# Patient Record
Sex: FEMALE | Race: WHITE | NOT HISPANIC OR LATINO | Employment: FULL TIME | URBAN - METROPOLITAN AREA
[De-identification: names, ages, dates, MRNs, and addresses within clinical notes are randomized per-mention and may not be internally consistent; named-entity substitution may affect disease eponyms.]

---

## 2019-10-15 ENCOUNTER — HISTORICAL (OUTPATIENT)
Dept: ADMINISTRATIVE | Facility: HOSPITAL | Age: 49
End: 2019-10-15

## 2020-01-23 ENCOUNTER — HISTORICAL (OUTPATIENT)
Dept: ADMINISTRATIVE | Facility: HOSPITAL | Age: 50
End: 2020-01-23

## 2022-04-30 NOTE — OP NOTE
DATE OF SURGERY:        SURGEON:  Bang Cristina MD    DIAGNOSIS:  Palmar fasciitis, right palm.    OPERATION:    1. 14040, development of multiple flaps, right palm.   2. 26121, partial right palmar fasciectomy.   3. 19488, short arm splint immobilization.    FIRST ASSISTANT:  Bob Rolon, surgical nurse.    HISTORY:  This patient had surgery on October 15, 2019, by me here.  She had extensive palmar fibromatosis.  I made a large zigzag flap with multiple Z-plasties on both the radial and the ulnar aspects of her right palm and carried out a fairly extensive palmar fasciectomy.  This worked out well, but she developed another band in between these areas where I did not operate, and she had some firmness over on the radial aspect.  I did 2 small fasciectomies through the zigzag incisions.  This looked like a combination of some unoperated palmar fasciitis as well as scar tissue.  This was sent to the laboratory for histological examination.  A small amount of Kenalog was instilled in the wound, and the 2 wounds were closed with 4-0 Vicryl sutures.  A large compression dressing with a Fabián-Chip universal hand splint was applied.     Because of the complexity of the procedure, it was necessary to use a surgical nurse first assistant during the surgery.        ______________________________  Bang Cristina MD    [facility]  DD:  01/23/2020  Time:  01:09PM  DT:  01/23/2020  Time:  01:25PM  Job #:  [id number]

## 2022-04-30 NOTE — OP NOTE
DATE OF SURGERY:        SURGEON:  Bang Cristina MD    OPERATION:    1. 14040, Development of multiple flaps, right palm.    2. 26121, Palmar fasciectomy, ulnar aspect of the right palm.    3. 26123, Palmar fasciectomy, radial aspect, right palm.    4. 29125, Splint immobilization.    5. 83359, Peripheral nerve block by surgeon with Exparel to lessen postoperative pain and the need for narcotics.    FIRST ASSISTANT:  Bob Quiñonez, surgical nurse.    INDICATIONS:  This lady has developed fairly rapidly progress in palmar fasciitis of the right palm.  She does not have any flexion contracture yet, but since this is a much more aggressive disease in a woman than a man, she is anxious to have this operated on and the fascia removed.  The procedure was discussed with her, as well as the risks and possibility of complication.  She was told that her hand would look terrible at first, but it would gradually get better and, at the end of a year, it should be very difficult to see where we had operated.  I talked to her about the risks and the possibility for further complication, including nerve damage and she understood this and accepted it.    DESCRIPTION OF PROCEDURE:  A pneumatic tourniquet was inflated to 280 mmHg after the arm had been exsanguinated.  A marking pen was used to beau out multiple Z-plasties on the ulnar aspect of her hand and a single Z-plasty on the radial aspect of the palm.  The Z-plasties were opened up exposing the entirety of the ulnar aspect of the palm.  Palmar fasciectomy was carried out of the ulnar aspect of the hand beginning at the ulnar base of the palm and all the way down to the fingers, taking care to avoid the neurovascular bundles and the underlying tendons.  This worked out quite well and was able to remove all the fibers that we saw.  A large singular Z-plasty was made on the radial aspect of the palm and the smaller mass of fascia was excised.  This specimen was  photographed and sent to the laboratory for histological examination.     0.5 cc of Kenalog was instilled in the wounds and all the flaps were interdigitated and sutured together with 5-0 Vicryl sutures in mattress fashion to elevate the skin.  The proximal aspects of the incisions were left open a little bit to allow for bandages.       At the end of the procedure, I partially carried out a peripheral nerve block utilizing Exparel to lessen her postoperative pain and, also, to markedly lessen the need of narcotics and opioids to help control that pain.  The Exparel should last approximately 3 days.  A large compression dressing with a Fabián-Chip universal     hand splint was applied.  Patient tolerated the procedure well and went to the recovery room in good condition.        ______________________________  MD HAYDEN Kyle/DAVEY  DD:  10/15/2019  Time:  12:20PM  DT:  10/15/2019  Time:  12:48PM  Job #:  [id number]

## 2024-02-07 ENCOUNTER — HOSPITAL ENCOUNTER (INPATIENT)
Facility: HOSPITAL | Age: 54
LOS: 4 days | Discharge: HOME | DRG: 871 | End: 2024-02-12
Attending: EMERGENCY MEDICINE | Admitting: INTERNAL MEDICINE

## 2024-02-07 DIAGNOSIS — E83.51 HYPOCALCEMIA: ICD-10-CM

## 2024-02-07 DIAGNOSIS — J18.9 COMMUNITY ACQUIRED PNEUMONIA, UNSPECIFIED LATERALITY: Primary | ICD-10-CM

## 2024-02-07 DIAGNOSIS — A41.9 SEPSIS: ICD-10-CM

## 2024-02-07 DIAGNOSIS — H66.90 OTITIS MEDIA, UNSPECIFIED LATERALITY, UNSPECIFIED OTITIS MEDIA TYPE: ICD-10-CM

## 2024-02-07 DIAGNOSIS — M62.82 NON-TRAUMATIC RHABDOMYOLYSIS: ICD-10-CM

## 2024-02-07 DIAGNOSIS — I95.9 HYPOTENSION, UNSPECIFIED HYPOTENSION TYPE: ICD-10-CM

## 2024-02-07 DIAGNOSIS — J10.1 INFLUENZA B: ICD-10-CM

## 2024-02-07 DIAGNOSIS — R51.9 NONINTRACTABLE HEADACHE, UNSPECIFIED CHRONICITY PATTERN, UNSPECIFIED HEADACHE TYPE: ICD-10-CM

## 2024-02-07 DIAGNOSIS — R00.1 BRADYCARDIA: ICD-10-CM

## 2024-02-07 DIAGNOSIS — E83.42 HYPOMAGNESEMIA: ICD-10-CM

## 2024-02-07 DIAGNOSIS — R07.9 CHEST PAIN: ICD-10-CM

## 2024-02-07 DIAGNOSIS — H66.91 RIGHT OTITIS MEDIA, UNSPECIFIED OTITIS MEDIA TYPE: ICD-10-CM

## 2024-02-07 PROBLEM — E86.1 HYPOVOLEMIA: Status: ACTIVE | Noted: 2024-02-07

## 2024-02-07 LAB
ALBUMIN SERPL-MCNC: 2.5 G/DL (ref 3.5–5)
ALBUMIN/GLOB SERPL: 1 RATIO (ref 1.1–2)
ALLENS TEST BLOOD GAS (OHS): ABNORMAL
ALP SERPL-CCNC: 84 UNIT/L (ref 40–150)
ALT SERPL-CCNC: 309 UNIT/L (ref 0–55)
APPEARANCE UR: ABNORMAL
AST SERPL-CCNC: 224 UNIT/L (ref 5–34)
BACTERIA #/AREA URNS AUTO: ABNORMAL /HPF
BASE EXCESS BLD CALC-SCNC: -2.3 MMOL/L
BASOPHILS # BLD AUTO: 0.02 X10(3)/MCL
BASOPHILS NFR BLD AUTO: 0.1 %
BILIRUB SERPL-MCNC: 1.4 MG/DL
BILIRUB UR QL STRIP.AUTO: NEGATIVE
BLOOD GAS SAMPLE TYPE (OHS): ABNORMAL
BNP BLD-MCNC: 36.9 PG/ML
BUN SERPL-MCNC: 24 MG/DL (ref 9.8–20.1)
CALCIUM SERPL-MCNC: 6.7 MG/DL (ref 8.4–10.2)
CHLORIDE SERPL-SCNC: 106 MMOL/L (ref 98–107)
CK SERPL-CCNC: 1301 U/L (ref 29–168)
CK SERPL-CCNC: 996 U/L (ref 29–168)
CO2 BLDA-SCNC: 23.7 MMOL/L
CO2 SERPL-SCNC: 19 MMOL/L (ref 22–29)
COHGB MFR BLDA: 2 %
COLOR UR AUTO: YELLOW
CREAT SERPL-MCNC: 1.4 MG/DL (ref 0.55–1.02)
D DIMER PPP IA.FEU-MCNC: >=20 UG/ML FEU (ref 0–0.5)
DRAWN BY BLOOD GAS (OHS): ABNORMAL
EOSINOPHIL # BLD AUTO: 0 X10(3)/MCL (ref 0–0.9)
EOSINOPHIL NFR BLD AUTO: 0 %
ERYTHROCYTE [DISTWIDTH] IN BLOOD BY AUTOMATED COUNT: 13.2 % (ref 11.5–17)
FLUAV AG UPPER RESP QL IA.RAPID: NOT DETECTED
FLUBV AG UPPER RESP QL IA.RAPID: DETECTED
GFR SERPLBLD CREATININE-BSD FMLA CKD-EPI: 46 MLS/MIN/1.73/M2
GLOBULIN SER-MCNC: 2.5 GM/DL (ref 2.4–3.5)
GLUCOSE SERPL-MCNC: 103 MG/DL (ref 74–100)
GLUCOSE UR QL STRIP.AUTO: NORMAL
GRAN CASTS #/AREA URNS LPF: ABNORMAL /LPF
HCO3 BLDA-SCNC: 22.5 MMOL/L
HCT VFR BLD AUTO: 33.3 % (ref 37–47)
HGB BLD-MCNC: 11.1 G/DL (ref 12–16)
HOLD SPECIMEN: NORMAL
HYALINE CASTS #/AREA URNS LPF: ABNORMAL /LPF
IMM GRANULOCYTES # BLD AUTO: 0.15 X10(3)/MCL (ref 0–0.04)
IMM GRANULOCYTES NFR BLD AUTO: 1 %
INHALED O2 CONCENTRATION: 21 %
KETONES UR QL STRIP.AUTO: ABNORMAL
LACTATE SERPL-SCNC: 1.4 MMOL/L (ref 0.5–2.2)
LACTATE SERPL-SCNC: 2 MMOL/L (ref 0.5–2.2)
LEUKOCYTE ESTERASE UR QL STRIP.AUTO: 25
LIPASE SERPL-CCNC: 6 U/L
LYMPHOCYTES # BLD AUTO: 0.44 X10(3)/MCL (ref 0.6–4.6)
LYMPHOCYTES NFR BLD AUTO: 2.8 %
MAGNESIUM SERPL-MCNC: 1.4 MG/DL (ref 1.6–2.6)
MCH RBC QN AUTO: 31 PG (ref 27–31)
MCHC RBC AUTO-ENTMCNC: 33.3 G/DL (ref 33–36)
MCV RBC AUTO: 93 FL (ref 80–94)
METHGB MFR BLDA: 0.7 %
MONOCYTES # BLD AUTO: 0.45 X10(3)/MCL (ref 0.1–1.3)
MONOCYTES NFR BLD AUTO: 2.9 %
MUCOUS THREADS URNS QL MICRO: ABNORMAL /LPF
NEUTROPHILS # BLD AUTO: 14.65 X10(3)/MCL (ref 2.1–9.2)
NEUTROPHILS NFR BLD AUTO: 93.2 %
NITRITE UR QL STRIP.AUTO: NEGATIVE
NRBC BLD AUTO-RTO: 0 %
O2 HB BLOOD GAS (OHS): 75.5 %
OXYHGB MFR BLDA: 12 G/DL
PCO2 BLDA: 38 MMHG (ref 40–50)
PH BLDA: 7.38 [PH] (ref 7.3–7.4)
PH UR STRIP.AUTO: 6 [PH]
PLATELET # BLD AUTO: 144 X10(3)/MCL (ref 130–400)
PMV BLD AUTO: 10.7 FL (ref 7.4–10.4)
PO2 BLDA: 36 MMHG (ref 30–40)
POTASSIUM SERPL-SCNC: 3.8 MMOL/L (ref 3.5–5.1)
PROT SERPL-MCNC: 5 GM/DL (ref 6.4–8.3)
PROT UR QL STRIP.AUTO: ABNORMAL
RBC # BLD AUTO: 3.58 X10(6)/MCL (ref 4.2–5.4)
RBC #/AREA URNS AUTO: ABNORMAL /HPF
RBC UR QL AUTO: ABNORMAL
RSV A 5' UTR RNA NPH QL NAA+PROBE: NOT DETECTED
SAO2 % BLDA: 77.6 %
SARS-COV-2 RNA RESP QL NAA+PROBE: NOT DETECTED
SODIUM SERPL-SCNC: 135 MMOL/L (ref 136–145)
SP GR UR STRIP.AUTO: 1.02 (ref 1–1.03)
SQUAMOUS #/AREA URNS LPF: ABNORMAL /HPF
TROPONIN I SERPL-MCNC: <0.01 NG/ML (ref 0–0.04)
UROBILINOGEN UR STRIP-ACNC: NORMAL
WBC # SPEC AUTO: 15.71 X10(3)/MCL (ref 4.5–11.5)
WBC #/AREA URNS AUTO: ABNORMAL /HPF

## 2024-02-07 PROCEDURE — 25500020 PHARM REV CODE 255

## 2024-02-07 PROCEDURE — 96372 THER/PROPH/DIAG INJ SC/IM: CPT

## 2024-02-07 PROCEDURE — 0241U COVID/RSV/FLU A&B PCR: CPT | Performed by: EMERGENCY MEDICINE

## 2024-02-07 PROCEDURE — 96365 THER/PROPH/DIAG IV INF INIT: CPT

## 2024-02-07 PROCEDURE — G0378 HOSPITAL OBSERVATION PER HR: HCPCS

## 2024-02-07 PROCEDURE — 84484 ASSAY OF TROPONIN QUANT: CPT | Performed by: EMERGENCY MEDICINE

## 2024-02-07 PROCEDURE — 96368 THER/DIAG CONCURRENT INF: CPT

## 2024-02-07 PROCEDURE — 87086 URINE CULTURE/COLONY COUNT: CPT | Performed by: EMERGENCY MEDICINE

## 2024-02-07 PROCEDURE — 80053 COMPREHEN METABOLIC PANEL: CPT | Performed by: EMERGENCY MEDICINE

## 2024-02-07 PROCEDURE — 84466 ASSAY OF TRANSFERRIN: CPT

## 2024-02-07 PROCEDURE — 25000003 PHARM REV CODE 250: Performed by: EMERGENCY MEDICINE

## 2024-02-07 PROCEDURE — 83880 ASSAY OF NATRIURETIC PEPTIDE: CPT | Performed by: EMERGENCY MEDICINE

## 2024-02-07 PROCEDURE — 81001 URINALYSIS AUTO W/SCOPE: CPT | Performed by: EMERGENCY MEDICINE

## 2024-02-07 PROCEDURE — 96375 TX/PRO/DX INJ NEW DRUG ADDON: CPT

## 2024-02-07 PROCEDURE — 87154 CUL TYP ID BLD PTHGN 6+ TRGT: CPT | Performed by: EMERGENCY MEDICINE

## 2024-02-07 PROCEDURE — 83540 ASSAY OF IRON: CPT

## 2024-02-07 PROCEDURE — 83690 ASSAY OF LIPASE: CPT | Performed by: EMERGENCY MEDICINE

## 2024-02-07 PROCEDURE — 87040 BLOOD CULTURE FOR BACTERIA: CPT | Performed by: EMERGENCY MEDICINE

## 2024-02-07 PROCEDURE — 80074 ACUTE HEPATITIS PANEL: CPT

## 2024-02-07 PROCEDURE — 82803 BLOOD GASES ANY COMBINATION: CPT

## 2024-02-07 PROCEDURE — 85379 FIBRIN DEGRADATION QUANT: CPT | Performed by: EMERGENCY MEDICINE

## 2024-02-07 PROCEDURE — 99900035 HC TECH TIME PER 15 MIN (STAT)

## 2024-02-07 PROCEDURE — 87077 CULTURE AEROBIC IDENTIFY: CPT | Performed by: EMERGENCY MEDICINE

## 2024-02-07 PROCEDURE — 25000003 PHARM REV CODE 250

## 2024-02-07 PROCEDURE — 82728 ASSAY OF FERRITIN: CPT

## 2024-02-07 PROCEDURE — 83735 ASSAY OF MAGNESIUM: CPT | Performed by: EMERGENCY MEDICINE

## 2024-02-07 PROCEDURE — 83605 ASSAY OF LACTIC ACID: CPT | Performed by: EMERGENCY MEDICINE

## 2024-02-07 PROCEDURE — 85025 COMPLETE CBC W/AUTO DIFF WBC: CPT | Performed by: EMERGENCY MEDICINE

## 2024-02-07 PROCEDURE — 63600175 PHARM REV CODE 636 W HCPCS

## 2024-02-07 PROCEDURE — 63600175 PHARM REV CODE 636 W HCPCS: Performed by: EMERGENCY MEDICINE

## 2024-02-07 PROCEDURE — 82550 ASSAY OF CK (CPK): CPT | Mod: 91 | Performed by: EMERGENCY MEDICINE

## 2024-02-07 PROCEDURE — 93005 ELECTROCARDIOGRAM TRACING: CPT

## 2024-02-07 RX ORDER — ACETAMINOPHEN 500 MG
1000 TABLET ORAL
Status: COMPLETED | OUTPATIENT
Start: 2024-02-07 | End: 2024-02-07

## 2024-02-07 RX ORDER — CALCIUM GLUCONATE 20 MG/ML
1 INJECTION, SOLUTION INTRAVENOUS
Status: COMPLETED | OUTPATIENT
Start: 2024-02-07 | End: 2024-02-07

## 2024-02-07 RX ORDER — ENOXAPARIN SODIUM 100 MG/ML
40 INJECTION SUBCUTANEOUS EVERY 24 HOURS
Status: DISCONTINUED | OUTPATIENT
Start: 2024-02-07 | End: 2024-02-12 | Stop reason: HOSPADM

## 2024-02-07 RX ORDER — IBUPROFEN 200 MG
16 TABLET ORAL
Status: DISCONTINUED | OUTPATIENT
Start: 2024-02-07 | End: 2024-02-12 | Stop reason: HOSPADM

## 2024-02-07 RX ORDER — SODIUM CHLORIDE, SODIUM LACTATE, POTASSIUM CHLORIDE, CALCIUM CHLORIDE 600; 310; 30; 20 MG/100ML; MG/100ML; MG/100ML; MG/100ML
1000 INJECTION, SOLUTION INTRAVENOUS
Status: COMPLETED | OUTPATIENT
Start: 2024-02-07 | End: 2024-02-07

## 2024-02-07 RX ORDER — IBUPROFEN 200 MG
24 TABLET ORAL
Status: DISCONTINUED | OUTPATIENT
Start: 2024-02-07 | End: 2024-02-12 | Stop reason: HOSPADM

## 2024-02-07 RX ORDER — ONDANSETRON HYDROCHLORIDE 2 MG/ML
4 INJECTION, SOLUTION INTRAVENOUS
Status: COMPLETED | OUTPATIENT
Start: 2024-02-07 | End: 2024-02-07

## 2024-02-07 RX ORDER — OSELTAMIVIR PHOSPHATE 75 MG/1
75 CAPSULE ORAL 2 TIMES DAILY
Status: DISCONTINUED | OUTPATIENT
Start: 2024-02-07 | End: 2024-02-12 | Stop reason: HOSPADM

## 2024-02-07 RX ORDER — NALOXONE HCL 0.4 MG/ML
0.02 VIAL (ML) INJECTION
Status: DISCONTINUED | OUTPATIENT
Start: 2024-02-07 | End: 2024-02-12 | Stop reason: HOSPADM

## 2024-02-07 RX ORDER — MAGNESIUM SULFATE HEPTAHYDRATE 40 MG/ML
2 INJECTION, SOLUTION INTRAVENOUS
Status: COMPLETED | OUTPATIENT
Start: 2024-02-07 | End: 2024-02-07

## 2024-02-07 RX ORDER — SODIUM CHLORIDE 9 MG/ML
1000 INJECTION, SOLUTION INTRAVENOUS
Status: COMPLETED | OUTPATIENT
Start: 2024-02-07 | End: 2024-02-07

## 2024-02-07 RX ORDER — SODIUM CHLORIDE 0.9 % (FLUSH) 0.9 %
10 SYRINGE (ML) INJECTION EVERY 12 HOURS PRN
Status: DISCONTINUED | OUTPATIENT
Start: 2024-02-07 | End: 2024-02-12 | Stop reason: HOSPADM

## 2024-02-07 RX ORDER — GLUCAGON 1 MG
1 KIT INJECTION
Status: DISCONTINUED | OUTPATIENT
Start: 2024-02-07 | End: 2024-02-12 | Stop reason: HOSPADM

## 2024-02-07 RX ORDER — SODIUM CHLORIDE, SODIUM LACTATE, POTASSIUM CHLORIDE, CALCIUM CHLORIDE 600; 310; 30; 20 MG/100ML; MG/100ML; MG/100ML; MG/100ML
INJECTION, SOLUTION INTRAVENOUS CONTINUOUS
Status: DISCONTINUED | OUTPATIENT
Start: 2024-02-07 | End: 2024-02-12 | Stop reason: HOSPADM

## 2024-02-07 RX ADMIN — SODIUM CHLORIDE, POTASSIUM CHLORIDE, SODIUM LACTATE AND CALCIUM CHLORIDE: 600; 310; 30; 20 INJECTION, SOLUTION INTRAVENOUS at 10:02

## 2024-02-07 RX ADMIN — SODIUM CHLORIDE 1000 ML: 9 INJECTION, SOLUTION INTRAVENOUS at 07:02

## 2024-02-07 RX ADMIN — IOHEXOL 100 ML: 350 INJECTION, SOLUTION INTRAVENOUS at 05:02

## 2024-02-07 RX ADMIN — VANCOMYCIN HYDROCHLORIDE 1750 MG: 1.25 INJECTION, POWDER, LYOPHILIZED, FOR SOLUTION INTRAVENOUS at 03:02

## 2024-02-07 RX ADMIN — ACETAMINOPHEN 1000 MG: 500 TABLET ORAL at 02:02

## 2024-02-07 RX ADMIN — SODIUM CHLORIDE, POTASSIUM CHLORIDE, SODIUM LACTATE AND CALCIUM CHLORIDE 1000 ML: 600; 310; 30; 20 INJECTION, SOLUTION INTRAVENOUS at 06:02

## 2024-02-07 RX ADMIN — ONDANSETRON 4 MG: 2 INJECTION INTRAMUSCULAR; INTRAVENOUS at 02:02

## 2024-02-07 RX ADMIN — PIPERACILLIN AND TAZOBACTAM 4.5 G: 4; .5 INJECTION, POWDER, LYOPHILIZED, FOR SOLUTION INTRAVENOUS; PARENTERAL at 02:02

## 2024-02-07 RX ADMIN — PIPERACILLIN AND TAZOBACTAM 4.5 G: 4; .5 INJECTION, POWDER, LYOPHILIZED, FOR SOLUTION INTRAVENOUS; PARENTERAL at 10:02

## 2024-02-07 RX ADMIN — CALCIUM GLUCONATE 1 G: 20 INJECTION, SOLUTION INTRAVENOUS at 06:02

## 2024-02-07 RX ADMIN — CALCIUM GLUCONATE 1 G: 20 INJECTION, SOLUTION INTRAVENOUS at 04:02

## 2024-02-07 RX ADMIN — MAGNESIUM SULFATE HEPTAHYDRATE 2 G: 40 INJECTION, SOLUTION INTRAVENOUS at 05:02

## 2024-02-07 RX ADMIN — DOXYCYCLINE 100 MG: 100 INJECTION, POWDER, LYOPHILIZED, FOR SOLUTION INTRAVENOUS at 02:02

## 2024-02-07 RX ADMIN — ENOXAPARIN SODIUM 40 MG: 40 INJECTION SUBCUTANEOUS at 10:02

## 2024-02-07 RX ADMIN — SODIUM CHLORIDE 2718 ML: 9 INJECTION, SOLUTION INTRAVENOUS at 02:02

## 2024-02-07 RX ADMIN — OSELTAMAVIR PHOSPHATE 75 MG: 75 CAPSULE ORAL at 10:02

## 2024-02-07 NOTE — ED PROVIDER NOTES
ED PROVIDER NOTE  2/7/2024    CHIEF COMPLAINT:   Chief Complaint   Patient presents with    Altered Mental Status     Disoriented x 2 days; febrile, bilateral flank pain x 2 days. No OTC meds today. Sent from an urgent care.       HISTORY OF PRESENT ILLNESS:   Loreta Keith is a 53 y.o. female who presents with chief complaint Flu-like symptoms. Onset was about a week ago with cough associated with bodyaches, fever, nausea, vomiting, sinus congestion, right earache, generalized weakness, and lightheadedness. Reports went to urgent care 2 days ago and was started on levaquin, prednisone, antibiotic ear drops, without any improvement. Reports she feels very thirsty and confused. Denies headache, chest pain, abdominal pain, dysuria.    The history is provided by the patient.         REVIEW OF SYSTEMS: as noted in the HPI.  NURSING NOTES REVIEWED      PAST MEDICAL/SURGICAL HISTORY: History reviewed. No pertinent past medical history. History reviewed. No pertinent surgical history.    FAMILY HISTORY: History reviewed. No pertinent family history.    SOCIAL HISTORY:   Social History     Tobacco Use    Smoking status: Never    Smokeless tobacco: Never       ALLERGIES: Review of patient's allergies indicates:  No Known Allergies    PHYSICAL EXAM:  Initial Vitals [02/07/24 1404]   BP Pulse Resp Temp SpO2   (!) 86/51 (!) 130 (!) 22 (!) 103.1 °F (39.5 °C) 95 %      MAP       --         Physical Exam    Nursing note and vitals reviewed.  Constitutional: She appears well-developed and well-nourished. She appears lethargic. She has a sickly appearance. She appears ill. She appears distressed.   HENT:   Head: Normocephalic and atraumatic.   Right Ear: Tympanic membrane is erythematous and bulging. Tympanic membrane is not injected. A middle ear effusion is present.   Left Ear: Tympanic membrane normal.   Mouth/Throat: Uvula is midline. Mucous membranes are dry.   Eyes: EOM are normal. Pupils are equal, round, and reactive to  light.   Neck: Trachea normal. Neck supple.   Cardiovascular:  Regular rhythm and normal pulses.   Tachycardia present.         Pulmonary/Chest: Effort normal and breath sounds normal.   Abdominal: Abdomen is soft. Bowel sounds are normal. There is no abdominal tenderness.   No right CVA tenderness.  No left CVA tenderness. There is no rebound and no guarding.   Musculoskeletal:         General: Normal range of motion.      Cervical back: Neck supple.      Thoracic back: No bony tenderness.      Lumbar back: No bony tenderness.     Neurological: She is oriented to person, place, and time. She appears lethargic. GCS eye subscore is 4. GCS verbal subscore is 5. GCS motor subscore is 6.   Clouded mentation   Skin: Skin is warm and dry.   Psychiatric: She has a normal mood and affect. Thought content normal. Her speech is delayed. She is slowed.         RESULTS:  Labs Reviewed   COMPREHENSIVE METABOLIC PANEL - Abnormal; Notable for the following components:       Result Value    Sodium Level 135 (*)     Carbon Dioxide 19 (*)     Glucose Level 103 (*)     Blood Urea Nitrogen 24.0 (*)     Creatinine 1.40 (*)     Calcium Level Total 6.7 (*)     Protein Total 5.0 (*)     Albumin Level 2.5 (*)     Albumin/Globulin Ratio 1.0 (*)     Alanine Aminotransferase 309 (*)     Aspartate Aminotransferase 224 (*)     All other components within normal limits   URINALYSIS, REFLEX TO URINE CULTURE - Abnormal; Notable for the following components:    Appearance, UA Turbid (*)     Protein, UA 2+ (*)     Ketones, UA Trace (*)     Blood, UA 2+ (*)     Leukocyte Esterase, UA 25 (*)     WBC, UA 11-20 (*)     Bacteria, UA Trace (*)     Squamous Epithelial Cells, UA Occ (*)     Mucous, UA Trace (*)     Granular Casts 3-5 (*)     All other components within normal limits   CBC WITH DIFFERENTIAL - Abnormal; Notable for the following components:    WBC 15.71 (*)     RBC 3.58 (*)     Hgb 11.1 (*)     Hct 33.3 (*)     MPV 10.7 (*)     Lymph # 0.44  (*)     Neut # 14.65 (*)     IG# 0.15 (*)     All other components within normal limits   COVID/RSV/FLU A&B PCR - Abnormal; Notable for the following components:    Influenza B PCR Detected (*)     All other components within normal limits    Narrative:     The Xpert Xpress SARS-CoV-2/FLU/RSV plus is a rapid, multiplexed real-time PCR test intended for the simultaneous qualitative detection and differentiation of SARS-CoV-2, Influenza A, Influenza B, and respiratory syncytial virus (RSV) viral RNA in either nasopharyngeal swab or nasal swab specimens.         D DIMER, QUANTITATIVE - Abnormal; Notable for the following components:    D-Dimer >=20.00 (*)     All other components within normal limits   MAGNESIUM - Abnormal; Notable for the following components:    Magnesium Level 1.40 (*)     All other components within normal limits   CK - Abnormal; Notable for the following components:    Creatine Kinase 996 (*)     All other components within normal limits   CK - Abnormal; Notable for the following components:    Creatine Kinase 1,301 (*)     All other components within normal limits   BLOOD GAS - Abnormal; Notable for the following components:    pCO2, Blood gas 38.0 (*)     All other components within normal limits   LACTIC ACID, PLASMA - Normal   B-TYPE NATRIURETIC PEPTIDE - Normal   TROPONIN I - Normal   LIPASE - Normal   LACTIC ACID, PLASMA - Normal   BLOOD CULTURE [facility]   BLOOD CULTURE [facility]   CULTURE, URINE   CBC W/ AUTO DIFFERENTIAL    Narrative:     The following orders were created for panel order CBC auto differential.  Procedure                               Abnormality         Status                     ---------                               -----------         ------                     CBC with Differential[[id number]]       Abnormal            Final result                 Please view results for these tests on the individual orders.   EXTRA TUBES    Narrative:     The following orders were created for  panel order EXTRA TUBES.  Procedure                               Abnormality         Status                     ---------                               -----------         ------                     Light Blue Top Hold[[id number]]                             Final result               Red Top Hold[[id number]]                                    Final result               Light Green Top Hold[[id number]]                            Final result               Light Green Top Hold[[id number]]                            Final result               Gold Top Hold[[id number]]                                   Final result                 Please view results for these tests on the individual orders.   LIGHT BLUE TOP HOLD   RED TOP HOLD   LIGHT GREEN TOP HOLD   LIGHT GREEN TOP HOLD   GOLD TOP HOLD   EXTRA TUBES    Narrative:     The following orders were created for panel order EXTRA TUBES.  Procedure                               Abnormality         Status                     ---------                               -----------         ------                     Lavender Top Hold[[id number]]                               Final result                 Please view results for these tests on the individual orders.   LAVENDER TOP HOLD     Imaging Results              CTA Chest Non-Coronary (PE Studies) (Final result)  Result time 02/07/24 17:55:01      Final result by Freddie Pandya MD (02/07/24 17:55:01)                   Impression:      No pulmonary embolism identified.    Few small areas of ground-glass in the left lower lobe may be mild infection or inflammation.      Electronically signed by: Freddie Pandya  Date:    02/07/2024  Time:    17:55               Narrative:    EXAMINATION:  CTA CHEST NON CORONARY (PE STUDIES)    CLINICAL HISTORY:  Pulmonary embolism (PE) suspected, positive D-dimer;    TECHNIQUE:  CTA imaging of the chest after IV contrast. Axial, coronal and sagittal reconstructions, including MIP images, are  reviewed. Dose length product 194 mGycm. Automatic exposure control, adjustment of mA/kV or iterative reconstruction technique used to limit radiation dose.    COMPARISON:  No relevant comparison studies available at the time of dictation.    FINDINGS:  Diagnostic quality: Adequate    Pulmonary embolism: None identified.    Lung parenchyma: Mild motion, but few small areas of ground-glass identified within the left lower lobe.    Pleural effusion: None.    Mediastinum/loraine: Normal heart size and thoracic aortic caliber.  No pathologically enlarged lymph node.    Chest wall/axilla: No significant findings.    Upper abdomen: No significant findings.    Bones: No acute osseous process.                                       X-Ray Chest AP Portable (Final result)  Result time 02/07/24 14:36:23      Final result by Shay Gregory MD (02/07/24 14:36:23)                   Impression:      No acute cardiopulmonary process.      Electronically signed by: Shay Gregory  Date:    02/07/2024  Time:    14:36               Narrative:    EXAMINATION:  XR CHEST AP PORTABLE    CLINICAL HISTORY:  Sepsis;    TECHNIQUE:  Single view of the chest    COMPARISON:  No prior imaging available for comparison.    FINDINGS:  No focal opacification, pleural effusion, or pneumothorax.    The cardiomediastinal silhouette is within normal limits.    No acute osseous abnormality.                                      PROCEDURES:  Critical Care    Date/Time: 2/7/2024 8:48 PM    Performed by: Zaire Augustine DO  Authorized by: Zaire Augustine DO  Direct patient critical care time: 22 minutes  Ordering / reviewing critical care time: 5 minutes  Documentation critical care time: 5 minutes  Total critical care time (exclusive of procedural time) : 32 minutes  Critical care time was exclusive of separately billable procedures and treating other patients.  Critical care was necessary to treat or prevent imminent or life-threatening deterioration of the  following conditions: circulatory failure, sepsis, dehydration and renal failure.  Critical care was time spent personally by me on the following activities: development of treatment plan with patient or surrogate, interpretation of cardiac output measurements, evaluation of patient's response to treatment, examination of patient, obtaining history from patient or surrogate, ordering and performing treatments and interventions, ordering and review of laboratory studies, ordering and review of radiographic studies, pulse oximetry and re-evaluation of patient's condition.          ECG:  EKG Readings: (Independently Interpreted)   Initial Reading: No STEMI. Rhythm: Normal Sinus Rhythm. Heart Rate: 99. Ectopy: PACs. Conduction: Normal. Axis: Right Axis Deviation.       ED COURSE AND MEDICAL DECISION MAKING:  Medications   piperacillin-tazobactam (ZOSYN) 4.5 g in dextrose 5 % in water (D5W) 100 mL IVPB (MB+) (has no administration in time range)   sodium chloride 0.9% bolus 2,718 mL 2,718 mL (0 mLs Intravenous Stopped 2/7/24 1525)   piperacillin-tazobactam (ZOSYN) 4.5 g in dextrose 5 % in water (D5W) 100 mL IVPB (MB+) (0 g Intravenous Stopped 2/7/24 1508)   vancomycin (VANCOCIN) 1,750 mg in dextrose 5 % (D5W) 500 mL IVPB (0 mg Intravenous Stopped 2/7/24 1725)   acetaminophen tablet 1,000 mg (1,000 mg Oral Given 2/7/24 1427)   doxycycline 100 mg in dextrose 5 % in water (D5W) 100 mL IVPB (MB+) (0 mg Intravenous Stopped 2/7/24 1557)   ondansetron injection 4 mg (4 mg Intravenous Given 2/7/24 1430)   calcium gluconate 1 g in NS IVPB (premixed) (0 g Intravenous Stopped 2/7/24 1735)   lactated ringers infusion (0 mLs Intravenous Stopped 2/7/24 1856)   magnesium sulfate 2g in water 50mL IVPB (premix) (0 g Intravenous Stopped 2/7/24 1843)   iohexoL (OMNIPAQUE 350) 350 mg iodine/mL injection (100 mLs  Given 2/7/24 1715)   calcium gluconate 1 g in NS IVPB (premixed) (1 g Intravenous New Bag 2/7/24 1842)   0.9%  NaCl infusion  (1,000 mLs Intravenous New Bag 2/7/24 1925)     ED Course as of 02/07/24 2116 Wed Feb 07, 2024   1451 Leukocyte Esterase, UA(!): 25 [IB]   1451 WBC, UA(!): 11-20 [IB]   1451 Bacteria, UA(!): Trace [IB]   1451 Squamous Epithelial Cells, UA(!): Occ [IB]   1451 Mucous, UA(!): Trace [IB]   1451 Granular Casts(!): 3-5 [IB]   1451 RBC, UA: 0-5 [IB]   1451 Ketones, UA(!): Trace [IB]   1451 Protein, UA(!): 2+ [IB]   1501 WBC(!): 15.71 [IB]   1501 Hemoglobin(!): 11.1 [IB]   1501 Platelet Count: 144 [IB]   1501 Lymph #(!): 0.44 [IB]   1501 Neut #(!): 14.65 [IB]   1517 BNP: 36.9 [IB]   1517 Troponin I: <0.010 [IB]   1517 Lactic Acid Level: 2.0 [IB]   1518 Calcium(!!): 6.7  7.9 corrected for albumin [IB]   1518 Creatinine(!): 1.40 [IB]   1518 BUN(!): 24.0 [IB]   1518 CO2(!): 19 [IB]   1518 ALT(!): 309 [IB]   1518 AST(!): 224 [IB]   1545 Influenza A, Molecular: Not Detected [IB]   1546 Influenza B, Molecular(!): Detected [IB]   1546 RSV Ag by Molecular Method: Not Detected [IB]   1546 SARS-CoV2 (COVID-19) Qualitative PCR: Not Detected [IB]   1555 D-Dimer(!): >=20.00 [IB]   1619 CPK(!): 996 [IB]   1637 Magnesium (!): 1.40 [IB]   1637 Lipase: 6 [IB]   1851 CPK(!): 1,301 [IB]   1932 POC PH: 7.380 [IB]      ED Course User Index  [IB] Zaire Augustine, DO        Medical Decision Making  53-year-old female who presents with flu-like symptoms over the past week having some clotting of consciousness.  She was hypotensive and tachycardic and febrile upon arrival.  Sepsis orders initiated and she was given 30 milliliter/kg fluid bolus.  Blood pressure did seem to respond to fluid administration however would dip back down after fluids were discontinued so she was given an additional 3 more L of IV fluids.  Her chest x-ray read by radiologist showing no acute intrathoracic process.  CBC shows a leukocytosis of 15.7 with a hemoglobin 11.1.  CMP shows creatinine of 1.40, no previous labs for comparison, calcium 6.7 with albumin 2.5 in a  corrected calcium of 7.9.  Magnesium 1.4.  Given IV calcium gluconate along with magnesium sulfate.  Initial  with repeat CPK trending up at 1301.  VBG pH 7.38.  D-dimer significantly elevated so CTA chest was obtained which showed no evidence of PE and ground-glass opacities in the left lower lobe consistent with pneumonia.  Flu B positive.I attest a sepsis perfusion exam was performed within 6 hours of sepsis, severe sepsis, or septic shock presentation, following fluid resuscitation. I have spoken with the patient and/or caregivers. I have explained the patient's condition, diagnoses and treatment plan based on the information available to me at this time. I have answered the patient's and/or caregiver's questions and addressed any concerns. The patient and/or caregivers have as good an understanding of the patient's diagnosis, condition and treatment plan as can be expected at this point. The patient has been stabilized within the capability of the emergency department. The patient will be transported for further care and management or will be moved to an observation or inpatient service. I have communicated with the staff or medical practitioner taking over this patient's care.     Amount and/or Complexity of Data Reviewed  Labs: ordered. Decision-making details documented in ED Course.  Radiology: ordered.  ECG/medicine tests: ordered and independent interpretation performed.    Risk  OTC drugs.  Prescription drug management.  Decision regarding hospitalization.        CLINICAL IMPRESSION:  1. Community acquired pneumonia, unspecified laterality    2. Sepsis    3. Hypocalcemia    4. Hypotension, unspecified hypotension type    5. Influenza B    6. Non-traumatic rhabdomyolysis        DISPOSITION:   ED Disposition Condition    Admit Stable                    Zaire Augustine DO  02/07/24 2630

## 2024-02-07 NOTE — LETTER
February 10, 2024         [address] [place] [id number]  Phone: [phone]  Fax: [phone]       Date of hospitalization: 02/09/2024 and 02/10/2024    To Whom It May Concern:    Please be advised that under state and federal laws as it relates to patient privacy and Health Insurance Accountability Act (HIPAA), we can not release our patient(s) name without authorization. Although, we can confirm that the individual listed below did accompany a person to our facility for healthcare services to be provided during inpatient hospitalization.    This document confirms that Chandler Torres accompanied a patient to our facility on 02/09/2024 and 02/10/2024.    Sincerely,     Health Care Team at [facility]

## 2024-02-08 LAB
ABS NEUT CALC (OHS): 10.54 X10(3)/MCL (ref 2.1–9.2)
ALBUMIN SERPL-MCNC: 2.6 G/DL (ref 3.5–5)
ALBUMIN/GLOB SERPL: 0.9 RATIO (ref 1.1–2)
ALP SERPL-CCNC: 92 UNIT/L (ref 40–150)
ALT SERPL-CCNC: 260 UNIT/L (ref 0–55)
ANISOCYTOSIS BLD QL SMEAR: ABNORMAL
AST SERPL-CCNC: 139 UNIT/L (ref 5–34)
B PERT.PT PRMT NPH QL NAA+NON-PROBE: NOT DETECTED
BILIRUB SERPL-MCNC: 1 MG/DL
BUN SERPL-MCNC: 14.1 MG/DL (ref 9.8–20.1)
C PNEUM DNA NPH QL NAA+NON-PROBE: NOT DETECTED
CALCIUM SERPL-MCNC: 7.8 MG/DL (ref 8.4–10.2)
CHLORIDE SERPL-SCNC: 114 MMOL/L (ref 98–107)
CK SERPL-CCNC: 862 U/L (ref 29–168)
CO2 SERPL-SCNC: 19 MMOL/L (ref 22–29)
CREAT SERPL-MCNC: 1.02 MG/DL (ref 0.55–1.02)
EOSINOPHIL NFR BLD MANUAL: ABNORMAL %
EOSINOPHIL NFR BLD MANUAL: ABNORMAL %
ERYTHROCYTE [DISTWIDTH] IN BLOOD BY AUTOMATED COUNT: 13.5 % (ref 11.5–17)
FERRITIN SERPL-MCNC: 979.46 NG/ML (ref 4.63–204)
FOLATE SERPL-MCNC: 10.4 NG/ML (ref 7–31.4)
GFR SERPLBLD CREATININE-BSD FMLA CKD-EPI: >60 MLS/MIN/1.73/M2
GLOBULIN SER-MCNC: 3 GM/DL (ref 2.4–3.5)
GLUCOSE SERPL-MCNC: 136 MG/DL (ref 74–100)
HADV DNA NPH QL NAA+NON-PROBE: NOT DETECTED
HAV IGM SERPL QL IA: NONREACTIVE
HBV CORE IGM SERPL QL IA: NONREACTIVE
HBV SURFACE AG SERPL QL IA: NONREACTIVE
HCOV 229E RNA NPH QL NAA+NON-PROBE: NOT DETECTED
HCOV HKU1 RNA NPH QL NAA+NON-PROBE: NOT DETECTED
HCOV NL63 RNA NPH QL NAA+NON-PROBE: NOT DETECTED
HCOV OC43 RNA NPH QL NAA+NON-PROBE: NOT DETECTED
HCT VFR BLD AUTO: 35.3 % (ref 37–47)
HCV AB SERPL QL IA: NONREACTIVE
HGB BLD-MCNC: 11.9 G/DL (ref 12–16)
HMPV RNA NPH QL NAA+NON-PROBE: NOT DETECTED
HOLD SPECIMEN: NORMAL
HOLD SPECIMEN: NORMAL
HPIV1 RNA NPH QL NAA+NON-PROBE: NOT DETECTED
HPIV2 RNA NPH QL NAA+NON-PROBE: NOT DETECTED
HPIV3 RNA NPH QL NAA+NON-PROBE: NOT DETECTED
HPIV4 RNA NPH QL NAA+NON-PROBE: NOT DETECTED
IRON SATN MFR SERPL: 7 % (ref 20–50)
IRON SERPL-MCNC: 15 UG/DL (ref 50–170)
LYMPHOCYTES NFR BLD MANUAL: 0.22 X10(3)/MCL
LYMPHOCYTES NFR BLD MANUAL: 2 % (ref 13–40)
M PNEUMO DNA NPH QL NAA+NON-PROBE: NOT DETECTED
MAGNESIUM SERPL-MCNC: 2.3 MG/DL (ref 1.6–2.6)
MCH RBC QN AUTO: 30.6 PG (ref 27–31)
MCHC RBC AUTO-ENTMCNC: 33.7 G/DL (ref 33–36)
MCV RBC AUTO: 90.7 FL (ref 80–94)
METAMYELOCYTES NFR BLD MANUAL: 1 %
MONOCYTES NFR BLD MANUAL: 0.11 X10(3)/MCL (ref 0.1–1.3)
MONOCYTES NFR BLD MANUAL: 1 % (ref 2–11)
MRSA PCR SCRN (OHS): NOT DETECTED
NEUTROPHILS NFR BLD MANUAL: 85 % (ref 47–80)
NEUTS BAND NFR BLD MANUAL: 11 % (ref 0–11)
NRBC BLD AUTO-RTO: 0 %
OHS QRS DURATION: 82 MS
OHS QTC CALCULATION: 449 MS
PLATELET # BLD AUTO: 153 X10(3)/MCL (ref 130–400)
PLATELET # BLD EST: ADEQUATE 10*3/UL
PMV BLD AUTO: 11.4 FL (ref 7.4–10.4)
POTASSIUM SERPL-SCNC: 4 MMOL/L (ref 3.5–5.1)
PROT SERPL-MCNC: 5.6 GM/DL (ref 6.4–8.3)
RBC # BLD AUTO: 3.89 X10(6)/MCL (ref 4.2–5.4)
RBC MORPH BLD: ABNORMAL
RSV RNA NPH QL NAA+NON-PROBE: NOT DETECTED
RV+EV RNA NPH QL NAA+NON-PROBE: NOT DETECTED
SODIUM SERPL-SCNC: 140 MMOL/L (ref 136–145)
TIBC SERPL-MCNC: 187 UG/DL (ref 70–310)
TIBC SERPL-MCNC: 202 UG/DL (ref 250–450)
TRANSFERRIN SERPL-MCNC: 179 MG/DL (ref 180–382)
TRANSFERRIN SERPL-MCNC: 179 MG/DL (ref 180–382)
WBC # SPEC AUTO: 10.98 X10(3)/MCL (ref 4.5–11.5)

## 2024-02-08 PROCEDURE — 82550 ASSAY OF CK (CPK): CPT

## 2024-02-08 PROCEDURE — 87633 RESP VIRUS 12-25 TARGETS: CPT

## 2024-02-08 PROCEDURE — 27000207 HC ISOLATION

## 2024-02-08 PROCEDURE — 85027 COMPLETE CBC AUTOMATED: CPT

## 2024-02-08 PROCEDURE — 63600175 PHARM REV CODE 636 W HCPCS

## 2024-02-08 PROCEDURE — 84252 ASSAY OF VITAMIN B-2: CPT

## 2024-02-08 PROCEDURE — 80053 COMPREHEN METABOLIC PANEL: CPT

## 2024-02-08 PROCEDURE — 21400001 HC TELEMETRY ROOM

## 2024-02-08 PROCEDURE — 87641 MR-STAPH DNA AMP PROBE: CPT

## 2024-02-08 PROCEDURE — 83735 ASSAY OF MAGNESIUM: CPT

## 2024-02-08 PROCEDURE — 82746 ASSAY OF FOLIC ACID SERUM: CPT

## 2024-02-08 PROCEDURE — 96376 TX/PRO/DX INJ SAME DRUG ADON: CPT

## 2024-02-08 PROCEDURE — 99291 CRITICAL CARE FIRST HOUR: CPT

## 2024-02-08 PROCEDURE — 25000003 PHARM REV CODE 250

## 2024-02-08 PROCEDURE — 63700000 PHARM REV CODE 250 ALT 637 W/O HCPCS

## 2024-02-08 RX ORDER — AZITHROMYCIN 250 MG/1
500 TABLET, FILM COATED ORAL ONCE
Status: COMPLETED | OUTPATIENT
Start: 2024-02-08 | End: 2024-02-08

## 2024-02-08 RX ORDER — ONDANSETRON HYDROCHLORIDE 2 MG/ML
4 INJECTION, SOLUTION INTRAVENOUS EVERY 8 HOURS PRN
Status: DISCONTINUED | OUTPATIENT
Start: 2024-02-08 | End: 2024-02-10

## 2024-02-08 RX ORDER — IBUPROFEN 400 MG/1
400 TABLET ORAL EVERY 6 HOURS PRN
Status: DISCONTINUED | OUTPATIENT
Start: 2024-02-08 | End: 2024-02-12 | Stop reason: HOSPADM

## 2024-02-08 RX ORDER — AZITHROMYCIN 250 MG/1
250 TABLET, FILM COATED ORAL DAILY
Status: DISCONTINUED | OUTPATIENT
Start: 2024-02-09 | End: 2024-02-12 | Stop reason: HOSPADM

## 2024-02-08 RX ORDER — DIPHENHYDRAMINE HCL 25 MG
25 CAPSULE ORAL EVERY 6 HOURS PRN
Status: DISCONTINUED | OUTPATIENT
Start: 2024-02-08 | End: 2024-02-12 | Stop reason: HOSPADM

## 2024-02-08 RX ORDER — ACETAMINOPHEN 325 MG/1
650 TABLET ORAL ONCE
Status: COMPLETED | OUTPATIENT
Start: 2024-02-08 | End: 2024-02-08

## 2024-02-08 RX ORDER — ACETAMINOPHEN 650 MG/1
650 SUPPOSITORY RECTAL EVERY 6 HOURS PRN
Status: DISCONTINUED | OUTPATIENT
Start: 2024-02-08 | End: 2024-02-10

## 2024-02-08 RX ORDER — ACETAMINOPHEN 500 MG
1000 TABLET ORAL EVERY 6 HOURS PRN
Status: DISCONTINUED | OUTPATIENT
Start: 2024-02-08 | End: 2024-02-09

## 2024-02-08 RX ADMIN — VANCOMYCIN HYDROCHLORIDE 1000 MG: 1 INJECTION, POWDER, LYOPHILIZED, FOR SOLUTION INTRAVENOUS at 09:02

## 2024-02-08 RX ADMIN — ACETAMINOPHEN 1000 MG: 500 TABLET ORAL at 01:02

## 2024-02-08 RX ADMIN — SODIUM CHLORIDE, POTASSIUM CHLORIDE, SODIUM LACTATE AND CALCIUM CHLORIDE: 600; 310; 30; 20 INJECTION, SOLUTION INTRAVENOUS at 09:02

## 2024-02-08 RX ADMIN — ACETAMINOPHEN 1000 MG: 500 TABLET ORAL at 05:02

## 2024-02-08 RX ADMIN — IBUPROFEN 400 MG: 400 TABLET ORAL at 08:02

## 2024-02-08 RX ADMIN — ENOXAPARIN SODIUM 40 MG: 40 INJECTION SUBCUTANEOUS at 05:02

## 2024-02-08 RX ADMIN — AZITHROMYCIN DIHYDRATE 500 MG: 250 TABLET, FILM COATED ORAL at 11:02

## 2024-02-08 RX ADMIN — DIPHENHYDRAMINE HYDROCHLORIDE 25 MG: 25 CAPSULE ORAL at 12:02

## 2024-02-08 RX ADMIN — PIPERACILLIN AND TAZOBACTAM 4.5 G: 4; .5 INJECTION, POWDER, LYOPHILIZED, FOR SOLUTION INTRAVENOUS; PARENTERAL at 03:02

## 2024-02-08 RX ADMIN — OSELTAMAVIR PHOSPHATE 75 MG: 75 CAPSULE ORAL at 09:02

## 2024-02-08 RX ADMIN — ACETAMINOPHEN 650 MG: 325 TABLET, FILM COATED ORAL at 12:02

## 2024-02-08 RX ADMIN — PIPERACILLIN AND TAZOBACTAM 4.5 G: 4; .5 INJECTION, POWDER, LYOPHILIZED, FOR SOLUTION INTRAVENOUS; PARENTERAL at 07:02

## 2024-02-08 RX ADMIN — SODIUM CHLORIDE, POTASSIUM CHLORIDE, SODIUM LACTATE AND CALCIUM CHLORIDE: 600; 310; 30; 20 INJECTION, SOLUTION INTRAVENOUS at 10:02

## 2024-02-08 RX ADMIN — ACETAMINOPHEN 650 MG: 650 SUPPOSITORY RECTAL at 02:02

## 2024-02-08 RX ADMIN — ONDANSETRON 4 MG: 2 INJECTION INTRAMUSCULAR; INTRAVENOUS at 01:02

## 2024-02-08 NOTE — H&P
[facility] Medicine Wards   History & Physical Note     Resident Team: [facility] Medicine List 3  Attending Physician: Silvia Lazo MD  Date of Admit: 2/7/2024    Chief Complaint:     Altered Mental Status (Disoriented x 2 days; febrile, bilateral flank pain x 2 days. No OTC meds today. Sent from an urgent care.)       Subjective:      History of Present Illness:  Loreta Keith is a 53 y.o. female who with no significant past medical history who presented to [facility] ED on 2/7/2024  with complaint of Flu symptoms and AMS.  Patient is poor historian, she told ED physician that her complaints started last week, though told me that it started on lucero, with nasal congestion, non-productive cough, fevers, right ear pain, and fatigue, she went to an urgent care 2 days ago and was started on levaquin, prednisone, and antibiotic ear drops, she said she was disoriented and called her friend to take her to the ED, when she first arrived she was lethargic, dehydrated and ill appearing, she started becoming hypotensive with MAP down to 55, she would respond to fluid resuscitation, but would go back down after stopping the fluids.  Patient denies smoking, alcohol, or drugs, denies any medical history or chronic medical treatment.  ED lab:  Leukocytosis 15.71, normocytic anemia H/H 11.1/33.3, D-dimer >= 20, BUN/creatinine 24/1.4 no baseline, acidotic CO2 19, AST//309, hyponatremia 135,, hypocalcemia 6.7 corrected to 8, hypomagnesemia 1.4, CPK 1300, x-ray unremarkable, CT a chest no PE with small areas of ground-glass in the left lower lobe may be mild infection or inflammation. Hospital medicine was consulted for influenza with AMS and possible sepsis 2/2 PNA      Past Medical History:   has no past medical history on file.     Past Surgical History:   has no past surgical history on file.     Family History:  family history is not on file.     Social History:   reports that she has never smoked. She has never used smokeless  tobacco.     Allergies:  has No Known Allergies.     Home Medications:  Prior to Admission medications    Not on File         Review of Systems:  Review of Systems   Constitutional:  Positive for chills, fever and malaise/fatigue. Negative for weight loss.   HENT:  Positive for congestion.    Respiratory:  Positive for cough and shortness of breath. Negative for sputum production.    Cardiovascular:  Negative for chest pain.   Gastrointestinal:  Negative for abdominal pain, constipation, diarrhea, heartburn, nausea and vomiting.   Genitourinary:  Negative for dysuria and urgency.   Musculoskeletal:  Positive for myalgias.   Neurological:  Positive for headaches.            Objective:       Vital Signs (Most Recent):  Temp: 99.1 °F (37.3 °C) (02/07/24 1941)  Pulse: 81 (02/07/24 2204)  Resp: 17 (02/07/24 2204)  BP: 95/63 (02/07/24 2204)  SpO2: 98 % (02/07/24 2204) Vital Signs (24h Range):  Temp:  [98.8 °F (37.1 °C)-103.1 °F (39.5 °C)] 99.1 °F (37.3 °C)  Pulse:  [] 81  Resp:  [12-23] 17  SpO2:  [95 %-99 %] 98 %  BP: ()/(48-87) 95/63       Physical Examination:  Physical Exam  Constitutional:       Appearance: She is ill-appearing.   Eyes:      Extraocular Movements: Extraocular movements intact.   Cardiovascular:      Rate and Rhythm: Normal rate and regular rhythm.      Pulses: Normal pulses.      Heart sounds: Normal heart sounds.   Pulmonary:      Effort: Pulmonary effort is normal.      Breath sounds: Normal breath sounds.   Abdominal:      General: Bowel sounds are normal.      Palpations: Abdomen is soft.   Skin:     General: Skin is warm and dry.      Capillary Refill: Capillary refill takes less than 2 seconds.   Neurological:      Mental Status: She is alert and oriented to person, place, and time.           Laboratory:  Most Recent Data:  CBC:   Lab Results   Component Value Date    WBC 15.71 (H) 02/07/2024    HGB 11.1 (L) 02/07/2024    HCT 33.3 (L) 02/07/2024     02/07/2024    MCV 93.0  "02/07/2024    RDW 13.2 02/07/2024     BMP:   Lab Results   Component Value Date     (L) 02/07/2024    K 3.8 02/07/2024    CHLORIDE 106 02/07/2024    CO2 19 (L) 02/07/2024    BUN 24.0 (H) 02/07/2024    CREATININE 1.40 (H) 02/07/2024    GLUCOSE 103 (H) 02/07/2024    CALCIUM 6.7 (LL) 02/07/2024     LFTs:   Lab Results   Component Value Date    ALBUMIN 2.5 (L) 02/07/2024    BILITOT 1.4 02/07/2024     (H) 02/07/2024    ALKPHOS 84 02/07/2024     (H) 02/07/2024     Coags: No results found for: "INR", "PROTIME", "PTT"  FLP: No results found for: "CHOL", "HDL", "LDL", "TRIG"  DM:   Lab Results   Component Value Date    CREATININE 1.40 (H) 02/07/2024     Thyroid: No results found for: "TSH", "IRKBDP1NSAN", "T1PUGIX", "A2WETBH", "THYROIDAB"  Anemia: No results found for: "IRON", "FERRITIN", "TRANS", "TIBC", "STETVGBC27", "FOLATE"  Cardiac:   Lab Results   Component Value Date    TROPONINI <0.010 02/07/2024    CPK 1,301 (H) 02/07/2024    BNP 36.9 02/07/2024     Urinalysis:   Lab Results   Component Value Date    PHUA 6.0 02/07/2024    UROBILINOGEN Normal 02/07/2024    WBCUA 11-20 (A) 02/07/2024       Trended Cardiac Data:  Recent Labs   Lab 02/07/24  1438 02/07/24  1447 02/07/24  1824   TROPONINI <0.010  --   --    CPK  --  996* 1,301*   BNP 36.9  --   --            Microbiology Data:  Influenza B    Other Results:  EKG (my interpretation): normal EKG, normal sinus rhythm, unchanged from previous tracings    Radiology:  Imaging Results              CTA Chest Non-Coronary (PE Studies) (Final result)  Result time 02/07/24 17:55:01      Final result by Freddie Pandya MD (02/07/24 17:55:01)                   Impression:      No pulmonary embolism identified.    Few small areas of ground-glass in the left lower lobe may be mild infection or inflammation.      Electronically signed by: Freddie Pandya  Date:    02/07/2024  Time:    17:55               Narrative:    EXAMINATION:  CTA CHEST NON CORONARY (PE " STUDIES)    CLINICAL HISTORY:  Pulmonary embolism (PE) suspected, positive D-dimer;    TECHNIQUE:  CTA imaging of the chest after IV contrast. Axial, coronal and sagittal reconstructions, including MIP images, are reviewed. Dose length product 194 mGycm. Automatic exposure control, adjustment of mA/kV or iterative reconstruction technique used to limit radiation dose.    COMPARISON:  No relevant comparison studies available at the time of dictation.    FINDINGS:  Diagnostic quality: Adequate    Pulmonary embolism: None identified.    Lung parenchyma: Mild motion, but few small areas of ground-glass identified within the left lower lobe.    Pleural effusion: None.    Mediastinum/loraine: Normal heart size and thoracic aortic caliber.  No pathologically enlarged lymph node.    Chest wall/axilla: No significant findings.    Upper abdomen: No significant findings.    Bones: No acute osseous process.                                       X-Ray Chest AP Portable (Final result)  Result time 02/07/24 14:36:23      Final result by Shay Gregory MD (02/07/24 14:36:23)                   Impression:      No acute cardiopulmonary process.      Electronically signed by: Shay Gregory  Date:    02/07/2024  Time:    14:36               Narrative:    EXAMINATION:  XR CHEST AP PORTABLE    CLINICAL HISTORY:  Sepsis;    TECHNIQUE:  Single view of the chest    COMPARISON:  No prior imaging available for comparison.    FINDINGS:  No focal opacification, pleural effusion, or pneumothorax.    The cardiomediastinal silhouette is within normal limits.    No acute osseous abnormality.                                         Lines/Drains/Airways       Peripheral Intravenous Line  Duration                  Peripheral IV - Single Lumen 02/07/24 1420 18 G Anterior;Right Forearm <1 day         Peripheral IV - Single Lumen 02/07/24 1421 18 G Anterior;Right Forearm <1 day                     Assessment & Plan:     Severe Sepsis with  multifactorial etiologies  Otitis Media  PNA  Influenza B  Fever, leukocytosis with hypotension responsive to fluids  Influenza B positive, order Tamiflu 75 b.i.d. for 5 days  Received 4 L fluid in the ED, on continuous resuscitation 125 cc/hour  Start broad-spectrum antibiotic coverage with vancomycin and Zosyn, deescalate for culture  MRSA PCR ordered  Otitis media covered with Zosyn, deescalate to amoxicillin if cultures are negative  Respiratory panel and cultures ordered    KIRBY vs CKD  Admission BUN/creatinine 24/1.4, no baseline  Most likely prerenal azotemia in the setting of dehydration  Replete electrolytes as needed  On  cc/hour    Transaminitis  Patient denies alcohol drinking or chronic medication use  Most likely due to stress in the setting of sepsis  Hepatitis panel ordered  Follow up with daily labs    Normocytic anemia  H/H 11.1/33.3  Ordered iron panel, B12, folate      CODE STATUS: Full  Access: PIV  Antibiotics:  Vancomycin and Zosyn  Diet:  Regular  DVT Prophylaxis:  Lovenox  GI Prophylaxis:  None  Fluids:  LR at 125 cc/hour      Disposition:  53-year-old female admitted for severe sepsis with hypotension responsive to fluids, discharge home once medically stable.    Kirti Cardenas MD  Internal Medicine - PGY-1

## 2024-02-08 NOTE — PROGRESS NOTES
[facility] Progress Note    Patient Name: Loreta Keith  MRN: [id number]  Admission Date: 2/7/2024  Hospital Length of Stay: 0 days  Code Status: Full Code  Attending Provider: Silvia Lazo MD  Primary Care Provider: Isha, Primary Doctor     Subjective:      Brief HPI:  Loreta Keith is a 53 y.o. female who with no significant past medical history who presented to [facility] ED on 2/7/2024  with complaint of Flu symptoms and AMS.  Patient is poor historian, she told ED physician that her complaints started last week, though told me that it started on lucero, with nasal congestion, non-productive cough, fevers, right ear pain, and fatigue, she went to an urgent care 2 days ago and was started on levaquin, prednisone, and antibiotic ear drops, she said she was disoriented and called her friend to take her to the ED, when she first arrived she was lethargic, dehydrated and ill appearing, she started becoming hypotensive with MAP down to 55, she would respond to fluid resuscitation, but would go back down after stopping the fluids.  Patient denies smoking, alcohol, or drugs, denies any medical history or chronic medical treatment.  ED lab:  Leukocytosis 15.71, normocytic anemia H/H 11.1/33.3, D-dimer >= 20, BUN/creatinine 24/1.4 no baseline, acidotic CO2 19, AST//309, hyponatremia 135,, hypocalcemia 6.7 corrected to 8, hypomagnesemia 1.4, CPK 1300, x-ray unremarkable, CT a chest no PE with small areas of ground-glass in the left lower lobe may be mild infection or inflammation. Hospital medicine was consulted for influenza with AMS and possible sepsis 2/2 PNA      Interval History:  Patient with improved mentation this morning.  Alert and oriented x3.  States she is feeling slightly better but continues to have body aches and significant ear pain on right side with noted crusting and drainage on external ear canal.  States she also has headache and every time she coughs her head hurts.  Has had few more  fevers overnight that have broken with Tylenol.  Afebrile this morning.      Review of Systems:  The remainder of the 14 point ROS is noncontributory or negative unless mentioned/reviewed above.     Objective:     Vital Signs:  Vital Signs (Most Recent):  Temp: 98.6 °F (37 °C) (02/08/24 0900)  Pulse: 96 (02/08/24 0900)  Resp: 16 (02/08/24 0900)  BP: 94/66 (02/08/24 0900)  SpO2: 97 % (02/08/24 0900)  Body mass index is 31.28 kg/m².  Weight: 90.6 kg (199 lb 11.8 oz) Vital Signs (24h Range):  Temp:  [98.6 °F (37 °C)-103.1 °F (39.5 °C)] 98.6 °F (37 °C)  Pulse:  [] 96  Resp:  [12-23] 16  SpO2:  [95 %-99 %] 97 %  BP: ()/(48-87) 94/66       Input/output:     Intake/Output Summary (Last 24 hours) at 2/8/2024 1118  Last data filed at 2/7/2024 1945  Gross per 24 hour   Intake --   Output 1400 ml   Net -1400 ml       Physical Examination:  General:  Flushed appearance on bilateral cheeks, well developed, well nourished, no acute respiratory distress  Head: Normocephalic, atraumatic.  Crusting and drainage noted in right external ear.  Difficult to visualize tympanic membrane  Eyes: PERRL, anicteric sclera  Throat: No posterior pharyngeal erythema or exudate, no tonsillar exudate  Neck: supple, normal ROM, no JVD  CVS:  RRR, S1 and S2 normal, no murmurs, no added heart sounds, rubs, gallops, regular peripheral pulses, and no peripheral edema  Resp:  Lungs clear to auscultation bilaterally, no wheezes, rales, or rhonci  GI:  Abdomen soft, non-tender, non-distended, normoactive bowel sounds  MSK:  Full range of motion, no obvious deformities   Skin:  No rashes, ulcers, erythema  Neuro:  Alert and oriented x3, No focal neuro deficits, CNII-XII grossly intact  Psych:  Appropriate mood and affect       Lines/Drains/Airways       None                    Laboratory:    Recent Labs   Lab 02/07/24  1438 02/08/24  0459   WBC 15.71* 10.98   RBC 3.58* 3.89*   HGB 11.1* 11.9*   HCT 33.3* 35.3*   MCV 93.0 90.7   MCH 31.0 30.6    MCHC 33.3 33.7   RDW 13.2 13.5    153   MPV 10.7* 11.4*      Recent Labs   Lab 02/07/24  1438 02/07/24  1447 02/07/24  1923 02/08/24  0459   *  --   --  140   K 3.8  --   --  4.0   CHLORIDE 106  --   --  114*   CO2 19*  --   --  19*   BUN 24.0*  --   --  14.1   CREATININE 1.40*  --   --  1.02   CALCIUM 6.7*  --   --  7.8*   PH  --   --  7.380  --    MG  --  1.40*  --  2.30   ALBUMIN 2.5*  --   --  2.6*   ALKPHOS 84  --   --  92   *  --   --  260*   *  --   --  139*   BILITOT 1.4  --   --  1.0        Other Results:  Estimated Creatinine Clearance: 75.4 mL/min (based on SCr of 1.02 mg/dL).    Current Medications:     Infusions:   lactated ringers 125 mL/hr at 02/08/24 1013         Scheduled:   enoxparin  40 mg Subcutaneous Daily    oseltamivir  75 mg Oral BID    piperacillin-tazobactam (Zosyn) IV (PEDS and ADULTS) (extended infusion is not appropriate)  4.5 g Intravenous Q8H    vancomycin (VANCOCIN) IV (PEDS and ADULTS)  1,000 mg Intravenous Q12H         PRN:   acetaminophen    acetaminophen    dextrose 10%    dextrose 10%    glucagon (human recombinant)    glucose    glucose    naloxone    ondansetron    sodium chloride 0.9%    vancomycin - pharmacy to dose        Microbiology Data:  Microbiology Results (last 7 days)       Procedure Component Value Units Date/Time    Urine culture [[id number]] Collected: 02/07/24 1415    Order Status: Completed Specimen: Urine Updated: 02/08/24 0645     Urine Culture No Growth At 24 Hours    Respiratory Culture [[id number]] Collected: 02/08/24 0114    Order Status: Sent Specimen: Sputum from Nasal Swab     Blood culture x two cultures. Draw prior to antibiotics. [[id number]] Collected: 02/07/24 1438    Order Status: Resulted Specimen: Blood from Arm, Right Updated: 02/07/24 1444    Blood culture x two cultures. Draw prior to antibiotics. [[id number]] Collected: 02/07/24 1438    Order Status: Resulted Specimen: Blood from Arm, Left Updated: 02/07/24 1441              Antibiotics and Day Number of Therapy:  Antibiotics (From admission, onward)      Start     Stop Route Frequency Ordered    02/08/24 0900  vancomycin (VANCOCIN) 1,000 mg in dextrose 5 % (D5W) 250 mL IVPB         -- IV Every 12 hours (non-standard times) 02/08/24 0724 02/07/24 2300  piperacillin-tazobactam (ZOSYN) 4.5 g in dextrose 5 % in water (D5W) 100 mL IVPB (MB+)         03/08/24 2259 IV Every 8 hours (non-standard times) 02/07/24 2140 02/07/24 2239  vancomycin - pharmacy to dose  (vancomycin IVPB (PEDS and ADULTS))        See Hyperspace for full Linked Orders Report.    -- IV pharmacy to manage frequency 02/07/24 2140             Imaging:  CTA Chest Non-Coronary (PE Studies)  Narrative: EXAMINATION:  CTA CHEST NON CORONARY (PE STUDIES)    CLINICAL HISTORY:  Pulmonary embolism (PE) suspected, positive D-dimer;    TECHNIQUE:  CTA imaging of the chest after IV contrast. Axial, coronal and sagittal reconstructions, including MIP images, are reviewed. Dose length product 194 mGycm. Automatic exposure control, adjustment of mA/kV or iterative reconstruction technique used to limit radiation dose.    COMPARISON:  No relevant comparison studies available at the time of dictation.    FINDINGS:  Diagnostic quality: Adequate    Pulmonary embolism: None identified.    Lung parenchyma: Mild motion, but few small areas of ground-glass identified within the left lower lobe.    Pleural effusion: None.    Mediastinum/loraine: Normal heart size and thoracic aortic caliber.  No pathologically enlarged lymph node.    Chest wall/axilla: No significant findings.    Upper abdomen: No significant findings.    Bones: No acute osseous process.  Impression: No pulmonary embolism identified.    Few small areas of ground-glass in the left lower lobe may be mild infection or inflammation.    Electronically signed by: Freddie Pandya  Date:    02/07/2024  Time:    17:55  X-Ray Chest AP Portable  Narrative: EXAMINATION:  XR CHEST  AP PORTABLE    CLINICAL HISTORY:  Sepsis;    TECHNIQUE:  Single view of the chest    COMPARISON:  No prior imaging available for comparison.    FINDINGS:  No focal opacification, pleural effusion, or pneumothorax.    The cardiomediastinal silhouette is within normal limits.    No acute osseous abnormality.  Impression: No acute cardiopulmonary process.    Electronically signed by: Shay Gregory  Date:    02/07/2024  Time:    14:36        Assessment & Plan:   Severe Sepsis with multifactorial etiologies  Otitis Media  PNA  Influenza B  Fever, leukocytosis with hypotension responsive to fluids  Influenza B positive, order Tamiflu 75 b.i.d. for 5 days  Received 4 L fluid in the ED, on continuous resuscitation 125 cc/hour  Start broad-spectrum antibiotic coverage with vancomycin and Zosyn, deescalate for culture  MRSA PCR negative  Otitis media covered with Zosyn, deescalate to amoxicillin if cultures are negative  Respiratory panel and cultures pending  Added azithromycin for atypical pneumonia coverage  CT temporal bone to rule out mastoiditis.  Consider consult to ENT pending results     KIRBY   Admission BUN/creatinine 24/1.4, no baseline  Most likely prerenal azotemia in the setting of dehydration  Replete electrolytes as needed  On  cc/hour  Repeat CR this a.m. improved     Transaminitis  Patient denies alcohol drinking or chronic medication use  Most likely due to stress in the setting of sepsis  Hepatitis panel negative  Labs trending down on repeat this a.m.  Follow up with daily labs     Normocytic anemia  H/H 11.1/33.3  Ordered iron panel-consistent with chronic disease  B12 and folate WNL        CODE STATUS: Full  Access: PIV  Antibiotics:  Vancomycin and Zosyn  Diet:  Regular  DVT Prophylaxis:  Lovenox  GI Prophylaxis:  None  Fluids:  LR at 125 cc/hour      Disposition: Loreta Keith is a 53 y.o. female who is admitted for sepsis secondary to influenza B and ear infection which failed outpatient  antibiotic therapy.  Discharge home once medically stable.        Mica Bedolla MD  Internal Medicine Resident PGY-I  [facility] Emergency Dept  02/08/2024

## 2024-02-08 NOTE — NURSING
Dr Bedolla notified of redness to pt's neck. Pt is complaining that her neck feels like it's on fire. Dr Bedolla stated she'll come see pt

## 2024-02-08 NOTE — MEDICAL/APP STUDENT
[facility] Medicine  Progress Note      Patient Name: Loreta Keith  : 1970  MRN: [id number]  Patient Class: OP- Observation   Admission Date: 2024   Length of Stay: 0  Admitting Service: Hospital Medicine  Attending Physician: Silvia Lazo MD  Resident: Matilda  Intern: Chioma  Medical Student: Joseph  PCP: Isha, Primary Doctor    CHIEF COMPLAINT   Altered Mental Status    HOSPITAL COURSE     Brief HPI:  Loreta Keith is a 53 y.o. female who with no significant past medical history who presented to [facility] ED on 2024  with complaint of Flu symptoms and AMS.  Patient is poor historian, she told ED physician that her complaints started last week, though told me that it started on lucero, with nasal congestion, non-productive cough, fevers, right ear pain, and fatigue, she went to an urgent care 2 days ago and was started on levaquin, prednisone, and antibiotic ear drops, she said she was disoriented and called her friend to take her to the ED, when she first arrived she was lethargic, dehydrated and ill appearing, she started becoming hypotensive with MAP down to 55, she would respond to fluid resuscitation, but would go back down after stopping the fluids.  Patient denies smoking, alcohol, or drugs, denies any medical history or chronic medical treatment.  ED lab:  Leukocytosis 15.71, normocytic anemia H/H 11.1/33.3, D-dimer >= 20, BUN/creatinine 24/1.4 no baseline, acidotic CO2 19, AST//309, hyponatremia 135,, hypocalcemia 6.7 corrected to 8, hypomagnesemia 1.4, CPK 1300, x-ray unremarkable, CT a chest no PE with small areas of ground-glass in the left lower lobe may be mild infection or inflammation. Hospital medicine was consulted for influenza with AMS and possible sepsis 2/2 PNA    Interval History:  Patient was febrile overnight to 102.6 F and given tylenol. Later vomited tylenol and was given suppository. Patient remains febrile to 100.4 F on most recent  "reading. Other vital signs are stable. Patient was seen today and is ill-appearing and reports feeling "bad" but better than admission. She states she is no longer confused but endorses new diarrhea that began last night. Has diminished appetite but was able to keep down what she did eat for breakfast. Patient has noticeable slowed movements of her extremities and pain to her right ear and worsening headache upon movement of her neck in all directions. Patient denies photophobia but "prefers the lights to be off." Blood culture, urine culture, and respiratory sputum culture all collected; Ucx NGTD 24h and Bcx and Rcx in process. Iron labs suggest anemia of chronic disease but MCV in normocytic range. Liver enzymes and BUN/Cr improved upon AM labs.     OBJECTIVE/PHYSICAL EXAM     VITAL SIGNS (MOST RECENT):  Temp: (!) 100.4 °F (38 °C) (02/08/24 0410)  Pulse: 82 (02/08/24 0501)  Resp: 18 (02/08/24 0501)  BP: 91/60 (02/08/24 0501)  SpO2: 96 % (02/08/24 0501) VITAL SIGNS (24 HOUR RANGE):  Temp:  [100.4 °F (38 °C)-102.6 °F (39.2 °C)]   Pulse:  [80-88]   Resp:  [15-20]   BP: ()/(56-72)   SpO2:  [95 %-98 %]      Physical Exam  Constitutional:       Appearance: She is ill-appearing.   HENT:      Head: Normocephalic and atraumatic.      Left Ear: Ear canal and external ear normal.      Ears:      Comments: Right ear with effusion behind tympanic membrane and yellow crust on external ear.     Nose: Congestion present.      Mouth/Throat:      Mouth: Mucous membranes are moist.   Eyes:      Conjunctiva/sclera: Conjunctivae normal.      Pupils: Pupils are equal, round, and reactive to light.   Neck:      Comments: ROM reduced secondary to "right ear pain and headache."  Cardiovascular:      Rate and Rhythm: Normal rate and regular rhythm.      Heart sounds: No murmur heard.  Pulmonary:      Effort: Pulmonary effort is normal. No respiratory distress.      Breath sounds: Normal breath sounds.   Abdominal:      General: " Abdomen is flat. There is no distension.      Palpations: Abdomen is soft.   Musculoskeletal:         General: Normal range of motion.      Cervical back: Rigidity present.   Lymphadenopathy:      Cervical: No cervical adenopathy.   Skin:     General: Skin is warm and dry.      Capillary Refill: Capillary refill takes less than 2 seconds.   Neurological:      General: No focal deficit present.      Mental Status: She is oriented to person, place, and time.      Cranial Nerves: No cranial nerve deficit.      Comments: Noticeable slowed movement of extremities.   Psychiatric:         Mood and Affect: Mood normal.         LABS/MICRO/MEDS/DIAGNOSTICS     LABS  CBC  Recent Labs     02/07/24 1438 02/08/24 0459   WBC 15.71* 10.98   RBC 3.58* 3.89*   HGB 11.1* 11.9*   HCT 33.3* 35.3*   MCV 93.0 90.7   MCH 31.0 30.6   MCHC 33.3 33.7   RDW 13.2 13.5    153     Anemia  Recent Labs     02/07/24  1447   FERRITIN 979.46*   IRON 15*   TIBC 202*     Coags  Recent Labs     02/07/24 1438   D-DIMER >=20.00*     Cardiac  Recent Labs     02/07/24 1438 02/07/24 1447 02/08/24 0459   BNP 36.9  --   --    CPK  --    < > 862*   TROPONINI <0.010  --   --     < > = values in this interval not displayed.     ABG/Lactate  Recent Labs     02/07/24 1438 02/07/24  1824 02/07/24  1923   PH  --   --  7.380   PCO2  --   --  38.0*   PO2  --   --  36.0   HCO3  --   --  22.5   LACTIC 2.0 1.4  --        BMP  Recent Labs     02/07/24 1438 02/07/24 1447 02/08/24 0459   *  --  140   K 3.8  --  4.0   CHLORIDE 106  --  114*   CO2 19*  --  19*   BUN 24.0*  --  14.1   CREATININE 1.40*  --  1.02   GLUCOSE 103*  --  136*   CALCIUM 6.7*  --  7.8*   MG  --  1.40* 2.30     LFTs  Recent Labs     02/07/24 1438 02/07/24 1447 02/08/24 0459   ALBUMIN 2.5*  --  2.6*   GLOBULIN 2.5  --  3.0   ALKPHOS 84  --  92   *  --  260*   *  --  139*   BILITOT 1.4  --  1.0   LIPASE  --  6  --      Inflammatory Markers  No results for input(s):  ""CRP", "LDH", "ESR" in the last 72 hours.  Lipid  No results for input(s): "CHOL", "TRIG", "LDL", "VLDL", "HDL" in the last 72 hours.  Diabetes  Recent Labs     02/07/24  1438 02/08/24  0459   GLUCOSE 103* 136*     Thyroid  No results for input(s): "TSH", "FREET4" in the last 72 hours.       MICROBIOLOGY  Microbiology Results (last 7 days)       Procedure Component Value Units Date/Time    Urine culture [[id number]] Collected: 02/07/24 1415    Order Status: Completed Specimen: Urine Updated: 02/08/24 0645     Urine Culture No Growth At 24 Hours    Respiratory Culture [[id number]] Collected: 02/08/24 0114    Order Status: Sent Specimen: Sputum from Nasal Swab     Blood culture x two cultures. Draw prior to antibiotics. [[id number]] Collected: 02/07/24 1438    Order Status: Resulted Specimen: Blood from Arm, Right Updated: 02/07/24 1444    Blood culture x two cultures. Draw prior to antibiotics. [[id number]] Collected: 02/07/24 1438    Order Status: Resulted Specimen: Blood from Arm, Left Updated: 02/07/24 1444               MEDICATIONS   enoxparin  40 mg Subcutaneous Daily    oseltamivir  75 mg Oral BID    piperacillin-tazobactam (Zosyn) IV (PEDS and ADULTS) (extended infusion is not appropriate)  4.5 g Intravenous Q8H    vancomycin (VANCOCIN) IV (PEDS and ADULTS)  1,000 mg Intravenous Q12H         INFUSIONS   lactated ringers 125 mL/hr at 02/07/24 0719          DIAGNOSTIC TESTS  CTA Chest Non-Coronary (PE Studies)   Final Result      No pulmonary embolism identified.      Few small areas of ground-glass in the left lower lobe may be mild infection or inflammation.         Electronically signed by: Freddie Pandya   Date:    02/07/2024   Time:    17:55      X-Ray Chest AP Portable   Final Result      No acute cardiopulmonary process.         Electronically signed by: Shay Gregory   Date:    02/07/2024   Time:    14:36           No results found for: "EF"       ASSESSMENT/PLAN   Severe Sepsis with multifactorial " etiologies  Otitis Media  PNA  Influenza B  Fever, leukocytosis with hypotension responsive to fluids  Influenza B positive, on Tamiflu 75 b.i.d. day 1/5  Received 4 L fluid in the ED, on continuous resuscitation 125 cc/hour  On broad-spectrum antibiotic coverage with vancomycin and Zosyn, deescalate for culture  MRSA PCR pending  Otitis media covered with Zosyn, deescalate to amoxicillin if cultures are negative  Respiratory panel and cultures pending     KIRBY vs CKD, improving  Admission BUN/creatinine 24/1.4, no baseline  Most likely prerenal azotemia in the setting of dehydration  Replete electrolytes as needed  On  cc/hour  BUN/CR improved to 14.1/1.02 this AM while on fluids     Transaminitis, improving  Patient denies alcohol drinking or chronic medication use  On admission AST//309  Most likely due to stress in the setting of sepsis  Hepatitis panel ordered  Follow up with daily labs  Improving with AST//260 this AM     Normocytic anemia  H/H 11.1/33.3  Ordered iron panel, high ferritin with low iron, transferrin, and TIBC, suggesting ACD  B12 pending, folate normal    Access: PIV  Antibiotics: Vancomycin, Zosyn  Diet: Adult Regular  DVT Prophylaxis: Enoxaparin 40mg  GI Prophylaxis: None  Fluids:  mL/hr    Anticipated discharge and disposition: Loreta Keith is a 53y female with no PMH who presented to the ED with AMS. There is suspected sepsis with multiple possible sources including otitis media and pneumonia. Patient is also influenza B positive. She requires continued ID workup and empiric treatment.    Dav Ruiz, MS4  [facility]

## 2024-02-08 NOTE — PROGRESS NOTES
"Pharmacokinetic Initial Assessment: IV Vancomycin    Assessment/Plan:    Initiate intravenous vancomycin with loading dose of 1750 mg once followed by a maintenance dose of vancomycin 1750 mg IV every 24 hours  Desired empiric serum trough concentration is 10 to 20 mcg/mL  Draw vancomycin trough level 60 min prior to third dose on 2/9 at approximately 1500  Pharmacy will continue to follow and monitor vancomycin.      Please contact pharmacy at extension [phone] with any questions regarding this assessment.     Thank you for the consult,   Daljitariana Mullen       Patient brief summary:  Loreta Keith is a 53 y.o. female initiated on antimicrobial therapy with IV Vancomycin for treatment of suspected lower respiratory infection    Drug Allergies:   Review of patient's allergies indicates:  No Known Allergies    Actual Body Weight:   90.6 kg    Renal Function:   Estimated Creatinine Clearance: 54.9 mL/min (A) (based on SCr of 1.4 mg/dL (H)).,     CBC (last 72 hours):  Recent Labs   Lab Result Units 02/07/24  1438   WBC x10(3)/mcL 15.71*   Hgb g/dL 11.1*   Hct % 33.3*   Platelet x10(3)/mcL 144   Mono % % 2.9   Eos % % 0.0   Basophil % % 0.1       Metabolic Panel (last 72 hours):  Recent Labs   Lab Result Units 02/07/24  1415 02/07/24  1438 02/07/24  1447   Sodium Level mmol/L  --  135*  --    Potassium Level mmol/L  --  3.8  --    Chloride mmol/L  --  106  --    Carbon Dioxide mmol/L  --  19*  --    Glucose Level mg/dL  --  103*  --    Glucose, UA  Normal  --   --    Blood Urea Nitrogen mg/dL  --  24.0*  --    Creatinine mg/dL  --  1.40*  --    Albumin Level g/dL  --  2.5*  --    Bilirubin Total mg/dL  --  1.4  --    Alkaline Phosphatase unit/L  --  84  --    Aspartate Aminotransferase unit/L  --  224*  --    Alanine Aminotransferase unit/L  --  309*  --    Magnesium Level mg/dL  --   --  1.40*       Drug levels (last 3 results):  No results for input(s): "VANCOMYCINRA", "VANCORANDOM", "VANCOMYCINPE", "VANCOPEAK", " ""VANCOMYCINTR", "VANCOTROUGH" in the last 72 hours.    Microbiologic Results:  Microbiology Results (last 7 days)       Procedure Component Value Units Date/Time    Respiratory Culture [[id number]]     Order Status: Sent Specimen: Sputum     Urine culture [[id number]] Collected: 02/07/24 1415    Order Status: Sent Specimen: Urine Updated: 02/07/24 1451    Blood culture x two cultures. Draw prior to antibiotics. [[id number]] Collected: 02/07/24 1438    Order Status: Resulted Specimen: Blood from Arm, Right Updated: 02/07/24 1444    Blood culture x two cultures. Draw prior to antibiotics. [[id number]] Collected: 02/07/24 1438    Order Status: Resulted Specimen: Blood from Arm, Left Updated: 02/07/24 1444            "

## 2024-02-08 NOTE — MEDICAL/APP STUDENT
[facility] Medicine  Progress Note      Patient Name: Loreta Keith  : 1970  MRN: [id number]  Patient Class: IP- Inpatient   Admission Date: 2024   Length of Stay: 0  Admitting Service: Hospital Medicine  Attending Physician: Silvia Lazo MD  PCP: sIha, Primary Doctor    CHIEF COMPLAINT   Altered Mental Status     HOSPITAL COURSE     Brief HPI:  Loreta Keith is a 53 y.o. female who with no significant past medical history who presented to [facility] ED on 2024  with complaint of flu symptoms and AMS.  Patient is poor historian, she told ED physician that her complaints started last week, though told me that it started on lucero, with nasal congestion, non-productive cough, fevers, right ear pain, and fatigue, she went to an urgent care 2 days ago and was started on levaquin, prednisone, and antibiotic ear drops, she said she was disoriented and called her friend to take her to the ED, when she first arrived she was lethargic, dehydrated and ill appearing, she started becoming hypotensive with MAP down to 55, she would respond to fluid resuscitation, but would go back down after stopping the fluids.  Patient denies smoking, alcohol, or drugs, denies any medical history or chronic medical treatment.  ED lab:  Leukocytosis 15.71, normocytic anemia H/H 11.1/33.3, D-dimer >= 20, BUN/creatinine 24/1.4 no baseline, acidotic CO2 19, AST//309, hyponatremia 135,, hypocalcemia 6.7 corrected to 8, hypomagnesemia 1.4, CPK 1300, x-ray unremarkable, CT a chest no PE with small areas of ground-glass in the left lower lobe may be mild infection or inflammation. Hospital medicine was consulted for influenza with AMS and possible sepsis 2/2 PNA    Interval History:  Pt was febrile to 102.4. Pt was given tylenol, which she vomited up. Pt is afebrile this morning. Vital signs improved since admission. Pt's mental status was improved this morning. She is alert and oriented x 3. She  states she continues to have body aches, a headache and R ear pain.      OBJECTIVE/PHYSICAL EXAM     VITAL SIGNS (MOST RECENT):  Temp: 98.6 °F (37 °C) (02/08/24 0900)  Pulse: 96 (02/08/24 0900)  Resp: 16 (02/08/24 0900)  BP: 94/66 (02/08/24 0900)  SpO2: 97 % (02/08/24 0900) VITAL SIGNS (24 HOUR RANGE):  Temp:  [98.6 °F (37 °C)-102.6 °F (39.2 °C)]   Pulse:  [82-96]   Resp:  [15-20]   BP: ()/(60-72)   SpO2:  [95 %-97 %]      Physical Exam  Constitutional:       Appearance: She is ill-appearing.   HENT:      Head: Normocephalic and atraumatic.      Left Ear: Tympanic membrane, ear canal and external ear normal.      Ears:      Comments: R external ear tender, canal erythematous, drainage present, not able to visualize TM due to pain      Mouth/Throat:      Mouth: Mucous membranes are moist.      Pharynx: Oropharynx is clear. Posterior oropharyngeal erythema present. No oropharyngeal exudate.   Eyes:      Conjunctiva/sclera: Conjunctivae normal.      Pupils: Pupils are equal, round, and reactive to light.   Cardiovascular:      Rate and Rhythm: Normal rate and regular rhythm.      Pulses: Normal pulses.      Heart sounds: Normal heart sounds.   Pulmonary:      Effort: Pulmonary effort is normal. No respiratory distress.      Breath sounds: Normal breath sounds. No stridor. No wheezing or rhonchi.   Abdominal:      General: Abdomen is flat. Bowel sounds are normal. There is no distension.      Palpations: Abdomen is soft.      Tenderness: There is no abdominal tenderness. There is no guarding.   Musculoskeletal:         General: No swelling.   Skin:     General: Skin is warm.   Neurological:      Mental Status: She is alert.      Comments: Mental status improved, pt is alert and oriented           LABS/MICRO/MEDS/DIAGNOSTICS     LABS  CBC  Recent Labs     02/07/24  1438 02/08/24  0459   WBC 15.71* 10.98   RBC 3.58* 3.89*   HGB 11.1* 11.9*   HCT 33.3* 35.3*   MCV 93.0 90.7   MCH 31.0 30.6   MCHC 33.3 33.7   RDW 13.2  "13.5    153     Anemia  Recent Labs     02/07/24  1447   FERRITIN 979.46*   IRON 15*   TIBC 202*     Coags  Recent Labs     02/07/24  1438   D-DIMER >=20.00*     Cardiac  Recent Labs     02/07/24 1438 02/07/24 1447 02/08/24 0459   BNP 36.9  --   --    CPK  --    < > 862*   TROPONINI <0.010  --   --     < > = values in this interval not displayed.     ABG/Lactate  Recent Labs     02/07/24 1438 02/07/24  1824 02/07/24  1923   PH  --   --  7.380   PCO2  --   --  38.0*   PO2  --   --  36.0   HCO3  --   --  22.5   LACTIC 2.0 1.4  --        BMP  Recent Labs     02/07/24 1438 02/07/24 1447 02/08/24 0459   *  --  140   K 3.8  --  4.0   CHLORIDE 106  --  114*   CO2 19*  --  19*   BUN 24.0*  --  14.1   CREATININE 1.40*  --  1.02   GLUCOSE 103*  --  136*   CALCIUM 6.7*  --  7.8*   MG  --  1.40* 2.30     LFTs  Recent Labs     02/07/24 1438 02/07/24 1447 02/08/24 0459   ALBUMIN 2.5*  --  2.6*   GLOBULIN 2.5  --  3.0   ALKPHOS 84  --  92   *  --  260*   *  --  139*   BILITOT 1.4  --  1.0   LIPASE  --  6  --      Inflammatory Markers  No results for input(s): "CRP", "LDH", "ESR" in the last 72 hours.  Lipid  No results for input(s): "CHOL", "TRIG", "LDL", "VLDL", "HDL" in the last 72 hours.  Diabetes  Recent Labs     02/07/24 1438 02/08/24 0459   GLUCOSE 103* 136*     Thyroid  No results for input(s): "TSH", "FREET4" in the last 72 hours.       MICROBIOLOGY  Microbiology Results (last 7 days)       Procedure Component Value Units Date/Time    Urine culture [[id number]] Collected: 02/07/24 1415    Order Status: Completed Specimen: Urine Updated: 02/08/24 0645     Urine Culture No Growth At 24 Hours    Respiratory Culture [[id number]] Collected: 02/08/24 0114    Order Status: Sent Specimen: Sputum from Nasal Swab     Blood culture x two cultures. Draw prior to antibiotics. [[id number]] Collected: 02/07/24 1438    Order Status: Resulted Specimen: Blood from Arm, Right Updated: 02/07/24 1444    " "Blood culture x two cultures. Draw prior to antibiotics. [[id number]] Collected: 02/07/24 1438    Order Status: Resulted Specimen: Blood from Arm, Left Updated: 02/07/24 1444               MEDICATIONS   [START ON 2/9/2024] azithromycin  250 mg Oral Daily    azithromycin  500 mg Oral Once    enoxparin  40 mg Subcutaneous Daily    oseltamivir  75 mg Oral BID    piperacillin-tazobactam (Zosyn) IV (PEDS and ADULTS) (extended infusion is not appropriate)  4.5 g Intravenous Q8H    vancomycin (VANCOCIN) IV (PEDS and ADULTS)  1,000 mg Intravenous Q12H         INFUSIONS   lactated ringers 125 mL/hr at 02/08/24 1013          DIAGNOSTIC TESTS  CTA Chest Non-Coronary (PE Studies)   Final Result      No pulmonary embolism identified.      Few small areas of ground-glass in the left lower lobe may be mild infection or inflammation.         Electronically signed by: Freddie Pandya   Date:    02/07/2024   Time:    17:55      X-Ray Chest AP Portable   Final Result      No acute cardiopulmonary process.         Electronically signed by: Shay Gregory   Date:    02/07/2024   Time:    14:36      CT Temporal Bone with and without contra    (Results Pending)        No results found for: "EF"       ASSESSMENT/PLAN   Severe Sepsis with multifactorial etiologies  Otitis Media  PNA  Influenza B  - Fever, leukocytosis with hypotension responsive to fluids  - Influenza B positive, order Tamiflu 75 b.i.d. for 5 days   - Received 4 L fluid in the ED, on continuous resuscitation 125 cc/hour  - Start broad-spectrum antibiotic coverage with vancomycin and Zosyn, deescalate for culture  - MRSA PCR negative  - Otitis media covered with Zosyn, deescalate to amoxicillin if cultures are negative  - Respiratory panel and cultures pending  - Added azithromycin for atypical pneumonia coverage  - CT temporal bone to rule out mastoiditis.  Consider consult to ENT pending results     KIRBY, improving   - Admission BUN/creatinine 24/1.4, no baseline  - Most " likely prerenal azotemia in the setting of dehydration   - Replete electrolytes as needed  - On  cc/hour  - Repeat CR this a.m. improved, 1.02     Transaminitis, improving   - Patient denies alcohol drinking or chronic medication use  - Most likely due to stress in the setting of sepsis  - Hepatitis panel negative   - Labs trending down on repeat this a.m., ,   - Follow up with daily labs     Normocytic anemia  - H/H 11.1/33.3  - Ordered iron panel-consistent with chronic disease  - B12 and folate WNL        CODE STATUS: Full  Access: PIV  Antibiotics:  Vancomycin and Zosyn  Diet:  Regular  DVT Prophylaxis:  Lovenox  GI Prophylaxis:  None  Fluids:  LR at 125 cc/hour      Disposition: Loreta Keith is a 53 y.o. female who is admitted for sepsis secondary to influenza B and ear infection which failed outpatient antibiotic therapy.  Discharge home once medically stable.      Annie Noel, MS3  [employer] [facility] [employer]

## 2024-02-09 LAB
ACINETOBACTER CALCOACETICUS-BAUMANNII COMPLEX (OHS): NOT DETECTED
ALBUMIN SERPL-MCNC: 2.6 G/DL (ref 3.5–5)
ALBUMIN/GLOB SERPL: 0.9 RATIO (ref 1.1–2)
ALP SERPL-CCNC: 91 UNIT/L (ref 40–150)
ALT SERPL-CCNC: 177 UNIT/L (ref 0–55)
AST SERPL-CCNC: 63 UNIT/L (ref 5–34)
BACTERIA UR CULT: NORMAL
BACTEROIDES FRAGILIS (OHS): NOT DETECTED
BASOPHILS # BLD AUTO: 0.03 X10(3)/MCL
BASOPHILS NFR BLD AUTO: 0.3 %
BILIRUB SERPL-MCNC: 0.7 MG/DL
BUN SERPL-MCNC: 9.9 MG/DL (ref 9.8–20.1)
C AURIS DNA BLD POS QL NAA+NON-PROBE: NOT DETECTED
C GATTII+NEOFOR DNA CSF QL NAA+NON-PROBE: NOT DETECTED
CALCIUM SERPL-MCNC: 7.7 MG/DL (ref 8.4–10.2)
CANDIDA ALBICANS (OHS): NOT DETECTED
CANDIDA GLABRATA (OHS): NOT DETECTED
CANDIDA KRUSEI (OHS): NOT DETECTED
CANDIDA PARAPSILOSIS (OHS): NOT DETECTED
CANDIDA TROPICALIS (OHS): NOT DETECTED
CHLORIDE SERPL-SCNC: 112 MMOL/L (ref 98–107)
CO2 SERPL-SCNC: 23 MMOL/L (ref 22–29)
CREAT SERPL-MCNC: 1 MG/DL (ref 0.55–1.02)
CTX-M (OHS): ABNORMAL
ENTEROBACTER CLOACAE COMPLEX (OHS): NOT DETECTED
ENTEROBACTERALES (OHS): NOT DETECTED
ENTEROCOCCUS FAECALIS (OHS): NOT DETECTED
ENTEROCOCCUS FAECIUM (OHS): NOT DETECTED
EOSINOPHIL # BLD AUTO: 0.07 X10(3)/MCL (ref 0–0.9)
EOSINOPHIL NFR BLD AUTO: 0.7 %
ERYTHROCYTE [DISTWIDTH] IN BLOOD BY AUTOMATED COUNT: 13.7 % (ref 11.5–17)
ESCHERICHIA COLI (OHS): NOT DETECTED
GFR SERPLBLD CREATININE-BSD FMLA CKD-EPI: >60 MLS/MIN/1.73/M2
GLOBULIN SER-MCNC: 2.9 GM/DL (ref 2.4–3.5)
GLUCOSE SERPL-MCNC: 99 MG/DL (ref 74–100)
GP B STREP DNA CSF QL NAA+NON-PROBE: NOT DETECTED
HAEM INFLU DNA CSF QL NAA+NON-PROBE: NOT DETECTED
HCT VFR BLD AUTO: 32.3 % (ref 37–47)
HGB BLD-MCNC: 10.7 G/DL (ref 12–16)
HOLD SPECIMEN: NORMAL
IMM GRANULOCYTES # BLD AUTO: 0.16 X10(3)/MCL (ref 0–0.04)
IMM GRANULOCYTES NFR BLD AUTO: 1.5 %
IMP (OHS): ABNORMAL
KLEBSIELLA AEROGENES (OHS): NOT DETECTED
KLEBSIELLA OXYTOCA (OHS): NOT DETECTED
KLEBSIELLA PNEUMONIAE GROUP (OHS): NOT DETECTED
KPC (OHS): ABNORMAL
L MONOCYTOG DNA CSF QL NAA+NON-PROBE: NOT DETECTED
LYMPHOCYTES # BLD AUTO: 1.11 X10(3)/MCL (ref 0.6–4.6)
LYMPHOCYTES NFR BLD AUTO: 10.5 %
MCH RBC QN AUTO: 30.7 PG (ref 27–31)
MCHC RBC AUTO-ENTMCNC: 33.1 G/DL (ref 33–36)
MCR-1 (OHS): ABNORMAL
MCV RBC AUTO: 92.8 FL (ref 80–94)
MECA/C (OHS): ABNORMAL
MECA/C AND MREJ (MRSA)(OHS): ABNORMAL
MONOCYTES # BLD AUTO: 0.58 X10(3)/MCL (ref 0.1–1.3)
MONOCYTES NFR BLD AUTO: 5.5 %
N MEN DNA CSF QL NAA+NON-PROBE: NOT DETECTED
NDM (OHS): ABNORMAL
NEUTROPHILS # BLD AUTO: 8.65 X10(3)/MCL (ref 2.1–9.2)
NEUTROPHILS NFR BLD AUTO: 81.5 %
NRBC BLD AUTO-RTO: 0 %
OXA-48-LIKE (OHS): ABNORMAL
PLATELET # BLD AUTO: 130 X10(3)/MCL (ref 130–400)
PLATELETS.RETICULATED NFR BLD AUTO: 7.4 % (ref 0.9–11.2)
PMV BLD AUTO: 11.7 FL (ref 7.4–10.4)
POTASSIUM SERPL-SCNC: 3.8 MMOL/L (ref 3.5–5.1)
PROT SERPL-MCNC: 5.5 GM/DL (ref 6.4–8.3)
PROTEUS SPP. (OHS): NOT DETECTED
PSEUDOMONAS AERUGINOSA (OHS): NOT DETECTED
RBC # BLD AUTO: 3.48 X10(6)/MCL (ref 4.2–5.4)
S ENT+BONG DNA STL QL NAA+NON-PROBE: NOT DETECTED
S PNEUM DNA CSF QL NAA+NON-PROBE: DETECTED
SERRATIA MARCESCENS (OHS): NOT DETECTED
SODIUM SERPL-SCNC: 140 MMOL/L (ref 136–145)
STAPHYLOCOCCUS AUREUS (OHS): NOT DETECTED
STAPHYLOCOCCUS EPIDERMIDIS (OHS): NOT DETECTED
STAPHYLOCOCCUS LUGDUNENSIS (OHS): NOT DETECTED
STAPHYLOCOCCUS SPP. (OHS): NOT DETECTED
STENOTROPHOMONAS MALTOPHILIA (OHS): NOT DETECTED
STREPTOCOCCUS PYOGENES (GROUP A)(OHS): NOT DETECTED
STREPTOCOCCUS SPP. (OHS): DETECTED
VANA/B (OHS): ABNORMAL
VANCOMYCIN TROUGH SERPL-MCNC: 12.1 UG/ML (ref 15–20)
VIM (OHS): ABNORMAL
WBC # SPEC AUTO: 10.6 X10(3)/MCL (ref 4.5–11.5)

## 2024-02-09 PROCEDURE — 63600175 PHARM REV CODE 636 W HCPCS: Performed by: INTERNAL MEDICINE

## 2024-02-09 PROCEDURE — 25000003 PHARM REV CODE 250

## 2024-02-09 PROCEDURE — 80202 ASSAY OF VANCOMYCIN: CPT

## 2024-02-09 PROCEDURE — 63600175 PHARM REV CODE 636 W HCPCS

## 2024-02-09 PROCEDURE — 27000207 HC ISOLATION

## 2024-02-09 PROCEDURE — 21400001 HC TELEMETRY ROOM

## 2024-02-09 PROCEDURE — 85025 COMPLETE CBC W/AUTO DIFF WBC: CPT

## 2024-02-09 PROCEDURE — 94761 N-INVAS EAR/PLS OXIMETRY MLT: CPT

## 2024-02-09 PROCEDURE — 80053 COMPREHEN METABOLIC PANEL: CPT

## 2024-02-09 PROCEDURE — 25000003 PHARM REV CODE 250: Performed by: INTERNAL MEDICINE

## 2024-02-09 PROCEDURE — 63700000 PHARM REV CODE 250 ALT 637 W/O HCPCS

## 2024-02-09 PROCEDURE — 87040 BLOOD CULTURE FOR BACTERIA: CPT

## 2024-02-09 RX ORDER — ACETAMINOPHEN 500 MG
1000 TABLET ORAL EVERY 6 HOURS PRN
Status: DISCONTINUED | OUTPATIENT
Start: 2024-02-09 | End: 2024-02-10

## 2024-02-09 RX ADMIN — IBUPROFEN 400 MG: 400 TABLET ORAL at 07:02

## 2024-02-09 RX ADMIN — PIPERACILLIN AND TAZOBACTAM 4.5 G: 4; .5 INJECTION, POWDER, LYOPHILIZED, FOR SOLUTION INTRAVENOUS; PARENTERAL at 01:02

## 2024-02-09 RX ADMIN — OSELTAMAVIR PHOSPHATE 75 MG: 75 CAPSULE ORAL at 08:02

## 2024-02-09 RX ADMIN — SODIUM CHLORIDE, POTASSIUM CHLORIDE, SODIUM LACTATE AND CALCIUM CHLORIDE: 600; 310; 30; 20 INJECTION, SOLUTION INTRAVENOUS at 03:02

## 2024-02-09 RX ADMIN — ONDANSETRON 4 MG: 2 INJECTION INTRAMUSCULAR; INTRAVENOUS at 10:02

## 2024-02-09 RX ADMIN — AZITHROMYCIN DIHYDRATE 250 MG: 250 TABLET ORAL at 08:02

## 2024-02-09 RX ADMIN — SODIUM CHLORIDE, POTASSIUM CHLORIDE, SODIUM LACTATE AND CALCIUM CHLORIDE: 600; 310; 30; 20 INJECTION, SOLUTION INTRAVENOUS at 11:02

## 2024-02-09 RX ADMIN — ACETAMINOPHEN 1000 MG: 500 TABLET ORAL at 08:02

## 2024-02-09 RX ADMIN — PIPERACILLIN AND TAZOBACTAM 4.5 G: 4; .5 INJECTION, POWDER, LYOPHILIZED, FOR SOLUTION INTRAVENOUS; PARENTERAL at 06:02

## 2024-02-09 RX ADMIN — ENOXAPARIN SODIUM 40 MG: 40 INJECTION SUBCUTANEOUS at 06:02

## 2024-02-09 RX ADMIN — PIPERACILLIN AND TAZOBACTAM 4.5 G: 4; .5 INJECTION, POWDER, LYOPHILIZED, FOR SOLUTION INTRAVENOUS; PARENTERAL at 09:02

## 2024-02-09 RX ADMIN — DIPHENHYDRAMINE HYDROCHLORIDE 25 MG: 25 CAPSULE ORAL at 01:02

## 2024-02-09 RX ADMIN — ACETAMINOPHEN 1000 MG: 500 TABLET ORAL at 03:02

## 2024-02-09 NOTE — PROGRESS NOTES
"Inpatient Nutrition Evaluation    Admit Date: 2024   Total duration of encounter: 2 days    Nutrition Recommendation/Prescription     Continue regular diet (pt refused oral supplement)  MVI/fe  Weekly wt  Will monitor nutrition status     Nutrition Assessment     Chart Review    Reason Seen: continuous nutrition monitoring    Malnutrition Screening Tool Results   Have you recently lost weight without trying?: No  Have you been eating poorly because of a decreased appetite?: No   MST Score: 0     Diagnosis:  Severe sepsis, otitis media, PNA, influenza B, KIRBY, transaminitis, anemia     Relevant Medical History: none    Nutrition-Related Medications: IVF LR @ 125ml/hr; azithromycin, zosyn     Nutrition-Related Labs:  (-) H/H 10.7/32.3(L) Gluc 99 Bun 9.9 Cr 1.0 K 3.8 Alb 2.6(L) AST 63(H) (H)     Diet Order: Diet Adult Regular  Oral Supplement Order: none  Appetite/Oral Intake: good/% of meals  Factors Affecting Nutritional Intake: nausea  Food/Mu-ism/Cultural Preferences:  likes soup  Food Allergies: none reported       Wound(s):   none  l  Comments    () Pt reported N/V improved; appetite fair/good; was on keto/low carb diet PTA; pt likes soups--will send; refused oral supplement; no recent wt loss reported. Current diet appropriate.     Anthropometrics    Height: 5' 7" (170.2 cm)    Last Weight: 90.6 kg (199 lb 11.8 oz) (24 1404) Weight Method: Bed Scale  BMI (Calculated): 31.3  BMI Classification: obese grade I (BMI 30-34.9)     Ideal Body Weight (IBW), Female: 135 lb     % Ideal Body Weight, Female (lb): 147.96 %         Usual Body Weight (UBW), k.6 kg  % Usual Body Weight: 100.21     Usual Weight Provided By: patient and EMR weight history    Wt Readings from Last 5 Encounters:   24 90.6 kg (199 lb 11.8 oz)     Weight Change(s) Since Admission:  Admit Weight: 90.6 kg (199 lb 11.8 oz) (24 1404)  No wt loss     Patient Education    Not applicable.    Monitoring & " Evaluation     Dietitian will monitor food and beverage intake, weight, and glucose/endocrine profile.  Nutrition Risk/Follow-Up: low (follow-up in 5-7 days)  Patients assigned 'low nutrition risk' status do not qualify for a full nutritional assessment but will be monitored and re-evaluated in a 5-7 day time period. Please consult if re-evaluation needed sooner.

## 2024-02-09 NOTE — PLAN OF CARE
02/09/24 1020   Discharge Assessment   Assessment Type Discharge Planning Assessment   Confirmed/corrected address, phone number and insurance Yes   Confirmed Demographics Correct on Facesheet   Source of Information patient;family   Reason For Admission Hypocalcemia  E83.42 Hypomagnesemia  J10.1 Influenza B  R07.9 Chest pain  A41.9 Sepsis  M62.82 Non-traumatic rhabdomyolysis  I95.9 Hypotension, unspecified hypotension type  H66.90 Otitis media, unspecified laterality, unspecified otitis media type  J18.9 Community acquired pneumonia, unspecified laterality   People in Home alone   Facility Arrived From: Urgent Care   Do you expect to return to your current living situation? Yes   Do you have help at home or someone to help you manage your care at home? Yes   Who are your caregiver(s) and their phone number(s)? Hafsa Sparks  Other  [phone]    2  Chandler Torres  Daughter  [phone]    3  Marcie Klein  Sister  [phone]   Prior to hospitilization cognitive status: Alert/Oriented   Current cognitive status: Alert/Oriented   Walking or Climbing Stairs Difficulty no   Dressing/Bathing Difficulty no   Equipment Currently Used at Home none   Readmission within 30 days? No   Patient currently being followed by outpatient case management? No   Do you currently have service(s) that help you manage your care at home? No   Do you take prescription medications? Yes  (Siomara Bryant)   Do you have prescription coverage? No   Do you have any problems affording any of your prescribed medications? TBD   Who is going to help you get home at discharge? Family   How do you get to doctors appointments? car, drives self   Are you on dialysis? No   Discharge Plan A Home   DME Needed Upon Discharge  none   Discharge Plan discussed with: Patient;Adult children;Sibling   Name(s) and Number(s) Chandler Torres  Daughter  [phone]    3  Marcie Klein  Sister  [phone]   Transition of Care Barriers  None   Physical Activity   On average, how many days per week do you engage in moderate to strenuous exercise (like a brisk walk)? 0 days   On average, how many minutes do you engage in exercise at this level? 0 min   Financial Resource Strain   How hard is it for you to pay for the very basics like food, housing, medical care, and heating? Not hard   Housing Stability   In the last 12 months, was there a time when you were not able to pay the mortgage or rent on time? N   In the last 12 months, how many places have you lived? 1   In the last 12 months, was there a time when you did not have a steady place to sleep or slept in a shelter (including now)? N   Transportation Needs   In the past 12 months, has lack of transportation kept you from medical appointments or from getting medications? no   In the past 12 months, has lack of transportation kept you from meetings, work, or from getting things needed for daily living? No   Food Insecurity   Within the past 12 months, you worried that your food would run out before you got the money to buy more. Never true   Within the past 12 months, the food you bought just didn't last and you didn't have money to get more. Never true   Stress   Do you feel stress - tense, restless, nervous, or anxious, or unable to sleep at night because your mind is troubled all the time - these days? Not at all   Social Connections   In a typical week, how many times do you talk on the phone with family, friends, or neighbors? More than 3   How often do you get together with friends or relatives? More than 3   Are you , , , , never , or living with a partner?    Alcohol Use   Q1: How often do you have a drink containing alcohol? Never   Q2: How many drinks containing alcohol do you have on a typical day when you are drinking? None   Q3: How often do you have six or more drinks on one occasion? Never     Pt  with 3 children; Resides alone;  Employed in [employer]; Independent with ADL's; Pending M/D application; Demographic information updated in Epic; No needs identified at this time.

## 2024-02-09 NOTE — PROGRESS NOTES
[facility] Progress Note    Patient Name: Loreta Keith  MRN: [id number]  Admission Date: 2/7/2024  Hospital Length of Stay: 1 days  Code Status: Full Code  Attending Provider: Silvia Lazo MD  Primary Care Provider: Isha, Primary Doctor     Subjective:      Brief HPI:  Loreta Keith is a 53 y.o. female who with no significant past medical history who presented to [facility] ED on 2/7/2024  with complaint of Flu symptoms and AMS.  Patient is poor historian, she told ED physician that her complaints started last week, though told me that it started on lucero, with nasal congestion, non-productive cough, fevers, right ear pain, and fatigue, she went to an urgent care 2 days ago and was started on levaquin, prednisone, and antibiotic ear drops, she said she was disoriented and called her friend to take her to the ED, when she first arrived she was lethargic, dehydrated and ill appearing, she started becoming hypotensive with MAP down to 55, she would respond to fluid resuscitation, but would go back down after stopping the fluids.  Patient denies smoking, alcohol, or drugs, denies any medical history or chronic medical treatment.  ED lab:  Leukocytosis 15.71, normocytic anemia H/H 11.1/33.3, D-dimer >= 20, BUN/creatinine 24/1.4 no baseline, acidotic CO2 19, AST//309, hyponatremia 135,, hypocalcemia 6.7 corrected to 8, hypomagnesemia 1.4, CPK 1300, x-ray unremarkable, CT a chest no PE with small areas of ground-glass in the left lower lobe may be mild infection or inflammation. Hospital medicine was consulted for influenza with AMS and possible sepsis 2/2 PNA      Interval History:  Patient with headache this a.m..  Resolved with ibuprofen and Tylenol.  Reports feeling better besides side.  Has been April afebrile 24 hours.  Discontinued vancomycin due to negative PCR.  Patient's blood culture grew strep pneumoniae in 1/2 bottles.  Continues to be covered with Zosyn and azithromycin.  Ordered repeat  cultures.  CT temporal bone today to rule out mastoiditis.      Review of Systems:  The remainder of the 14 point ROS is noncontributory or negative unless mentioned/reviewed above.     Objective:     Vital Signs:  Vital Signs (Most Recent):  Temp: 98 °F (36.7 °C) (02/09/24 1115)  Pulse: 66 (02/09/24 1115)  Resp: 18 (02/09/24 0311)  BP: 111/69 (02/09/24 1115)  SpO2: 97 % (02/09/24 1115)  Body mass index is 31.28 kg/m².  Weight: 90.6 kg (199 lb 11.8 oz) Vital Signs (24h Range):  Temp:  [97.8 °F (36.6 °C)-99.6 °F (37.6 °C)] 98 °F (36.7 °C)  Pulse:  [62-76] 66  Resp:  [18-20] 18  SpO2:  [96 %-99 %] 97 %  BP: ()/(60-74) 111/69       Input/output:     Intake/Output Summary (Last 24 hours) at 2/9/2024 1310  Last data filed at 2/9/2024 0344  Gross per 24 hour   Intake 360 ml   Output --   Net 360 ml       Physical Examination:  General:  Flushed appearance on bilateral cheeks, well developed, well nourished, no acute respiratory distress  Head: Normocephalic, atraumatic.  Crusting and drainage noted in right external ear.  Difficult to visualize tympanic membrane  Eyes: PERRL, anicteric sclera  Throat: No posterior pharyngeal erythema or exudate, no tonsillar exudate  Neck: supple, normal ROM, no JVD  CVS:  RRR, S1 and S2 normal, no murmurs, no added heart sounds, rubs, gallops, regular peripheral pulses, and no peripheral edema  Resp:  Lungs clear to auscultation bilaterally, no wheezes, rales, or rhonci  GI:  Abdomen soft, non-tender, non-distended, normoactive bowel sounds  MSK:  Full range of motion, no obvious deformities   Skin:  No rashes, ulcers, erythema  Neuro:  Alert and oriented x3, No focal neuro deficits, CNII-XII grossly intact  Psych:  Appropriate mood and affect       Lines/Drains/Airways       None                    Laboratory:    Recent Labs   Lab 02/07/24  1438 02/08/24  0459 02/09/24  0330   WBC 15.71* 10.98 10.60   RBC 3.58* 3.89* 3.48*   HGB 11.1* 11.9* 10.7*   HCT 33.3* 35.3* 32.3*   MCV 93.0  90.7 92.8   MCH 31.0 30.6 30.7   MCHC 33.3 33.7 33.1   RDW 13.2 13.5 13.7    153 130   MPV 10.7* 11.4* 11.7*      Recent Labs   Lab 02/07/24  1438 02/07/24  1447 02/07/24  1923 02/08/24  0459 02/09/24  0330   *  --   --  140 140   K 3.8  --   --  4.0 3.8   CHLORIDE 106  --   --  114* 112*   CO2 19*  --   --  19* 23   BUN 24.0*  --   --  14.1 9.9   CREATININE 1.40*  --   --  1.02 1.00   CALCIUM 6.7*  --   --  7.8* 7.7*   PH  --   --  7.380  --   --    MG  --  1.40*  --  2.30  --    ALBUMIN 2.5*  --   --  2.6* 2.6*   ALKPHOS 84  --   --  92 91   *  --   --  260* 177*   *  --   --  139* 63*   BILITOT 1.4  --   --  1.0 0.7        Other Results:  Estimated Creatinine Clearance: 76.9 mL/min (based on SCr of 1 mg/dL).    Current Medications:     Infusions:   lactated ringers 125 mL/hr at 02/08/24 2122         Scheduled:   azithromycin  250 mg Oral Daily    enoxparin  40 mg Subcutaneous Daily    oseltamivir  75 mg Oral BID    piperacillin-tazobactam (Zosyn) IV (PEDS and ADULTS) (extended infusion is not appropriate)  4.5 g Intravenous Q8H         PRN:   acetaminophen    acetaminophen    artificial tears    dextrose 10%    dextrose 10%    diphenhydrAMINE    glucagon (human recombinant)    glucose    glucose    ibuprofen    naloxone    ondansetron    sodium chloride 0.9%        Microbiology Data:  Microbiology Results (last 7 days)       Procedure Component Value Units Date/Time    Blood Culture [[id number]] Collected: 02/09/24 1237    Order Status: Sent Specimen: Blood from Antecubital, Right Updated: 02/09/24 1248    Blood Culture [[id number]] Collected: 02/09/24 1237    Order Status: Sent Specimen: Blood from Hand, Right Updated: 02/09/24 1248    BCID2 Panel [[id number]]  (Abnormal) Collected: 02/07/24 1438    Order Status: Completed Specimen: Blood from Arm, Left Updated: 02/09/24 1156     CTX-M (ESBL ) N/A     IMP (Cabapenemase ) N/A     KPC resistance gene (Carbapenemase  ) N/A     mcr-1 N/A     mecA ID N/A     Comment: Note: Antimicrobial resistance can occur via multiple mechanisms. A Not Detected result for antimicrobial resistance gene(s) does not indicate antimicrobial susceptibility. Subculturing is required for species identification and susceptibility testing of   isolates.        mecA/C and MREJ (MRSA) gene N/A     NDM (Carbapenemase ) N/A     OXA-48-like (Carbapenemase ) N/A     Heena/B (VRE gene) N/A     VIM (Carbapenemase ) N/A     Enterococcus faecalis Not Detected     Enterococcus faecium Not Detected     Listeria monocytogenes Not Detected     Staphylococcus spp. Not Detected     Staphylococcus aureus Not Detected     Staphylococcus epidermidis Not Detected     Staphylococcus lugdunensis Not Detected     Streptococcus spp. Detected     Streptococcus agalactiae (Group B) Not Detected     Streptococcus pneumoniae Detected     Streptococcus pyogenes (Group A) Not Detected     Acinetobacter calcoaceticus/baumannii complex Not Detected     Bacteroides fragilis Not Detected     Enterobacterales Not Detected     Enterobacter cloacae complex Not Detected     Escherichia coli Not Detected     Klebsiella aerogenes Not Detected     Klebsiella oxytoca Not Detected     Klebsiella pneumoniae group Not Detected     Proteus spp. Not Detected     Salmonella spp. Not Detected     Serratia marcescens Not Detected     Haemophilus influenzae Not Detected     Neisseria meningitidis Not Detected     Pseudomonas aeruginosa Not Detected     Stenotrophomonas maltophilia Not Detected     Candida albicans Not Detected     Candida auris Not Detected     Candida glabrata Not Detected     Candida krusei Not Detected     Candida parapsilosis Not Detected     Candida tropicalis Not Detected     Cryptococcus neoformans/gattii Not Detected    Narrative:      The [employer] BCID2 Panel is a multiplexed nucleic acid test intended for the use with Offermatica® 2.0 or  Eximia® ImaginAbArray® ADOMIC (formerly [employer])ch Systems for the simultaneous qualitative detection and identification of multiple bacterial and yeast nucleic acids and select genetic determinants associated with antimicrobial resistance.  The [employer] BCID2 Panel test is performed directly on blood culture samples identified as positive by a continuous monitoring blood culture system.  Results are intended to be interpreted in conjunction with Gram stain results.    Blood culture x two cultures. Draw prior to antibiotics. [[id number]]  (Abnormal) Collected: 02/07/24 1438    Order Status: Completed Specimen: Blood from Arm, Left Updated: 02/09/24 1156     GRAM STAIN Gram Positive Cocci, probable Streptococcus      Seen in gram stain of broth only      1 of 2 Anaerobic bottles positive    Urine culture [[id number]] Collected: 02/07/24 1415    Order Status: Completed Specimen: Urine Updated: 02/09/24 0756     Urine Culture No Significant Growth    Blood culture x two cultures. Draw prior to antibiotics. [[id number]]  (Normal) Collected: 02/07/24 1438    Order Status: Completed Specimen: Blood from Arm, Right Updated: 02/08/24 1900     CULTURE, BLOOD ([facility]) No Growth At 24 Hours    Respiratory Culture [[id number]] Collected: 02/08/24 0114    Order Status: Sent Specimen: Sputum from Nasal Swab              Antibiotics and Day Number of Therapy:  Antibiotics (From admission, onward)      Start     Stop Route Frequency Ordered    02/09/24 0900  azithromycin tablet 250 mg         02/13/24 0859 Oral Daily 02/08/24 1138    02/09/24 0900  piperacillin-tazobactam (ZOSYN) 4.5 g in dextrose 5 % in water (D5W) 100 mL IVPB (MB+)         03/09/24 0059 IV Every 8 hours (non-standard times) 02/09/24 0110             Imaging:  CTA Chest Non-Coronary (PE Studies)  Narrative: EXAMINATION:  CTA CHEST NON CORONARY (PE STUDIES)    CLINICAL HISTORY:  Pulmonary embolism (PE) suspected, positive D-dimer;    TECHNIQUE:  CTA imaging of the chest after IV contrast.  Axial, coronal and sagittal reconstructions, including MIP images, are reviewed. Dose length product 194 mGycm. Automatic exposure control, adjustment of mA/kV or iterative reconstruction technique used to limit radiation dose.    COMPARISON:  No relevant comparison studies available at the time of dictation.    FINDINGS:  Diagnostic quality: Adequate    Pulmonary embolism: None identified.    Lung parenchyma: Mild motion, but few small areas of ground-glass identified within the left lower lobe.    Pleural effusion: None.    Mediastinum/loraine: Normal heart size and thoracic aortic caliber.  No pathologically enlarged lymph node.    Chest wall/axilla: No significant findings.    Upper abdomen: No significant findings.    Bones: No acute osseous process.  Impression: No pulmonary embolism identified.    Few small areas of ground-glass in the left lower lobe may be mild infection or inflammation.    Electronically signed by: Freddie Pandya  Date:    02/07/2024  Time:    17:55  X-Ray Chest AP Portable  Narrative: EXAMINATION:  XR CHEST AP PORTABLE    CLINICAL HISTORY:  Sepsis;    TECHNIQUE:  Single view of the chest    COMPARISON:  No prior imaging available for comparison.    FINDINGS:  No focal opacification, pleural effusion, or pneumothorax.    The cardiomediastinal silhouette is within normal limits.    No acute osseous abnormality.  Impression: No acute cardiopulmonary process.    Electronically signed by: Shay Gregory  Date:    02/07/2024  Time:    14:36        Assessment & Plan:   Severe Sepsis with multifactorial etiologies  Otitis Media  PNA  Influenza B  Fever, leukocytosis with hypotension responsive to fluids  Influenza B positive, order Tamiflu 75 b.i.d. for 5 days  Received 4 L fluid in the ED, on continuous resuscitation 125 cc/hour  Start broad-spectrum antibiotic coverage with vancomycin and Zosyn, deescalate for culture  MRSA PCR negative discontinued vancomycin  Otitis media covered with Zosyn,  deescalate to amoxicillin if cultures are negative  Respiratory panel and cultures pending  Added azithromycin for atypical pneumonia coverage  CT temporal bone to rule out mastoiditis.  Consider consult to ENT pending results  Blood culture 1/2 bottles positive BC ID showing strep pneumoniae.  Repeat cultures ordered     KIRBY   Admission BUN/creatinine 24/1.4, no baseline  Most likely prerenal azotemia in the setting of dehydration  Replete electrolytes as needed  On  cc/hour  Repeat CR this a.m. improved     Transaminitis  Patient denies alcohol drinking or chronic medication use  Most likely due to stress in the setting of sepsis  Hepatitis panel negative  Labs trending down on repeat this a.m.  Follow up with daily labs     Normocytic anemia  H/H 11.1/33.3  Ordered iron panel-consistent with chronic disease  B12 and folate WNL        CODE STATUS: Full  Access: PIV  Antibiotics: azithro and Zosyn  Diet:  Regular  DVT Prophylaxis:  Lovenox  GI Prophylaxis:  None  Fluids:  LR at 125 cc/hour      Disposition: Loreta Keith is a 53 y.o. female who is admitted for sepsis secondary to influenza B and ear infection which failed outpatient antibiotic therapy.  Initial blood culture positive for strep.  Repeat cultures ordered.  Discharge home once medically stable.        Mica Bedolla MD  Internal Medicine Resident PGY-I  [facility] Emergency Dept  02/09/2024

## 2024-02-09 NOTE — MEDICAL/APP STUDENT
[facility] Medicine  Progress Note      Patient Name: Loreta Keith  : 1970  MRN: [id number]  Patient Class: IP- Inpatient   Admission Date: 2024   Length of Stay: 1  Admitting Service: Hospital Medicine  Attending Physician: Silvia Lazo MD  PCP: Isha, Primary Doctor    CHIEF COMPLAINT   Altered Mental Status     HOSPITAL COURSE     Brief HPI:  Loreta Keith is a 53 y.o. female who with no significant past medical history who presented to [facility] ED on 2024  with complaint of flu symptoms and AMS.  Patient is poor historian, she told ED physician that her complaints started last week, though told me that it started on lucero, with nasal congestion, non-productive cough, fevers, right ear pain, and fatigue, she went to an urgent care 2 days ago and was started on levaquin, prednisone, and antibiotic ear drops, she said she was disoriented and called her friend to take her to the ED, when she first arrived she was lethargic, dehydrated and ill appearing, she started becoming hypotensive with MAP down to 55, she would respond to fluid resuscitation, but would go back down after stopping the fluids.  Patient denies smoking, alcohol, or drugs, denies any medical history or chronic medical treatment.  ED lab:  Leukocytosis 15.71, normocytic anemia H/H 11.1/33.3, D-dimer >= 20, BUN/creatinine 24/1.4 no baseline, acidotic CO2 19, AST//309, hyponatremia 135,, hypocalcemia 6.7 corrected to 8, hypomagnesemia 1.4, CPK 1300, x-ray unremarkable, CT a chest no PE with small areas of ground-glass in the left lower lobe may be mild infection or inflammation. Hospital medicine was consulted for influenza with AMS and possible sepsis 2/2 PNA    Interval History:  No acute events overnight. Pt was afebrile with no leucocytosis. All other vital signs improved and stable. Pt reports a headache this morning. It began early this morning and is slightly improving. She denies  photophobia and phonophobia. Ibuprofen was given. She has had a few episodes of non-bloody diarrhea. MRSA PCR negative and vancomycin was stopped. CT mandible today.     OBJECTIVE/PHYSICAL EXAM     VITAL SIGNS (MOST RECENT):  Temp: 99.6 °F (37.6 °C) (02/09/24 0652)  Pulse: 76 (02/09/24 0652)  Resp: 18 (02/09/24 0311)  BP: 110/74 (02/09/24 0652)  SpO2: 99 % (02/09/24 0652) VITAL SIGNS (24 HOUR RANGE):  Temp:  [97.8 °F (36.6 °C)-99.6 °F (37.6 °C)]   Pulse:  [62-76]   Resp:  [18]   BP: (106-110)/(71-74)   SpO2:  [97 %-99 %]      Physical Exam  Constitutional:       General: She is not in acute distress.     Appearance: She is not ill-appearing.   HENT:      Head: Normocephalic and atraumatic.      Mouth/Throat:      Mouth: Mucous membranes are moist.      Pharynx: Oropharynx is clear.   Eyes:      Conjunctiva/sclera: Conjunctivae normal.      Pupils: Pupils are equal, round, and reactive to light.   Cardiovascular:      Rate and Rhythm: Normal rate and regular rhythm.      Pulses: Normal pulses.      Heart sounds: Normal heart sounds.   Pulmonary:      Effort: Pulmonary effort is normal.      Breath sounds: Normal breath sounds.   Abdominal:      General: Abdomen is flat. Bowel sounds are normal. There is no distension.      Palpations: Abdomen is soft.      Tenderness: There is no abdominal tenderness. There is no guarding.   Musculoskeletal:         General: No swelling.   Skin:     General: Skin is warm and dry.      Capillary Refill: Capillary refill takes less than 2 seconds.      Comments: Bilateral cheeks and neck slightly erythematous    Neurological:      Mental Status: She is alert and oriented to person, place, and time. Mental status is at baseline.         LABS/MICRO/MEDS/DIAGNOSTICS     LABS  CBC  Recent Labs     02/08/24  0459 02/09/24  0330   WBC 10.98 10.60   RBC 3.89* 3.48*   HGB 11.9* 10.7*   HCT 35.3* 32.3*   MCV 90.7 92.8   MCH 30.6 30.7   MCHC 33.7 33.1   RDW 13.5 13.7    130  "    Anemia  Recent Labs     02/07/24  1447   FERRITIN 979.46*   IRON 15*   TIBC 202*     Coags  Recent Labs     02/07/24  1438   D-DIMER >=20.00*     Cardiac  Recent Labs     02/07/24  1438 02/07/24 1447 02/08/24 0459   BNP 36.9  --   --    CPK  --    < > 862*   TROPONINI <0.010  --   --     < > = values in this interval not displayed.     ABG/Lactate  Recent Labs     02/07/24  1438 02/07/24  1824 02/07/24  1923   PH  --   --  7.380   PCO2  --   --  38.0*   PO2  --   --  36.0   HCO3  --   --  22.5   LACTIC 2.0 1.4  --        BMP  Recent Labs     02/07/24 1447 02/08/24 0459 02/09/24  0330   NA  --  140 140   K  --  4.0 3.8   CHLORIDE  --  114* 112*   CO2  --  19* 23   BUN  --  14.1 9.9   CREATININE  --  1.02 1.00   GLUCOSE  --  136* 99   CALCIUM  --  7.8* 7.7*   MG 1.40* 2.30  --      LFTs  Recent Labs     02/07/24 1447 02/08/24 0459 02/09/24  0330   ALBUMIN  --  2.6* 2.6*   GLOBULIN  --  3.0 2.9   ALKPHOS  --  92 91   ALT  --  260* 177*   AST  --  139* 63*   BILITOT  --  1.0 0.7   LIPASE 6  --   --      Inflammatory Markers  No results for input(s): "CRP", "LDH", "ESR" in the last 72 hours.  Lipid  No results for input(s): "CHOL", "TRIG", "LDL", "VLDL", "HDL" in the last 72 hours.  Diabetes  Recent Labs     02/07/24  1438 02/08/24 0459 02/09/24  0330   GLUCOSE 103* 136* 99     Thyroid  No results for input(s): "TSH", "FREET4" in the last 72 hours.       MICROBIOLOGY  Microbiology Results (last 7 days)       Procedure Component Value Units Date/Time    Blood culture x two cultures. Draw prior to antibiotics. [[id number]]  (Normal) Collected: 02/07/24 1438    Order Status: Completed Specimen: Blood from Arm, Left Updated: 02/08/24 1900     CULTURE, BLOOD ([facility]) No Growth At 24 Hours    Blood culture x two cultures. Draw prior to antibiotics. [[id number]]  (Normal) Collected: 02/07/24 1438    Order Status: Completed Specimen: Blood from Arm, Right Updated: 02/08/24 1900     CULTURE, BLOOD ([facility]) No Growth At 24 " "Hours    Urine culture [[id number]] Collected: 02/07/24 1415    Order Status: Completed Specimen: Urine Updated: 02/08/24 0645     Urine Culture No Growth At 24 Hours    Respiratory Culture [[id number]] Collected: 02/08/24 0114    Order Status: Sent Specimen: Sputum from Nasal Swab                MEDICATIONS   azithromycin  250 mg Oral Daily    enoxparin  40 mg Subcutaneous Daily    oseltamivir  75 mg Oral BID    piperacillin-tazobactam (Zosyn) IV (PEDS and ADULTS) (extended infusion is not appropriate)  4.5 g Intravenous Q8H    vancomycin (VANCOCIN) IV (PEDS and ADULTS)  1,000 mg Intravenous Q12H         INFUSIONS   lactated ringers 125 mL/hr at 02/08/24 2122          DIAGNOSTIC TESTS  CTA Chest Non-Coronary (PE Studies)   Final Result      No pulmonary embolism identified.      Few small areas of ground-glass in the left lower lobe may be mild infection or inflammation.         Electronically signed by: Freddie Pandya   Date:    02/07/2024   Time:    17:55      X-Ray Chest AP Portable   Final Result      No acute cardiopulmonary process.         Electronically signed by: Shay Gregory   Date:    02/07/2024   Time:    14:36      CT Temporal Bone without contrast    (Results Pending)        No results found for: "EF"       ASSESSMENT/PLAN     Severe Sepsis with multifactorial etiologies  Otitis Media  PNA  Influenza B  - Fever, leukocytosis with hypotension responsive to fluids  - Influenza B positive, order Tamiflu 75 b.i.d. for 5 days   - Received 4 L fluid in the ED, on continuous resuscitation 125 cc/hour  - Added azithromycin for atypical pneumonia coverage  - CT temporal bone to rule out mastoiditis.  Consider consult to ENT pending results  - Discontinue Vancomycin   - Continue antibiotic coverage with Zosyn, deescalate for culture  - MRSA PCR negative   - Otitis media covered with Zosyn, deescalate to amoxicillin if cultures are negative      KIRBY, improving   - Admission BUN/creatinine 24/1.4, no baseline  - " Most likely prerenal azotemia in the setting of dehydration  - Replete electrolytes as needed  - On  cc/hour  - Repeat CR this a.m. improved, 1.0     Transaminitis, improving   - Patient denies alcohol drinking or chronic medication use  - Most likely due to stress in the setting of sepsis  - Hepatitis panel negative  -  Labs trending down on repeat this a.m., AST 63,   - Follow up with daily labs     Normocytic anemia  - H/H 11.1/33.3  - Ordered iron panel-consistent with chronic disease  - B12 and folate WNL        CODE STATUS: Full  Access: PIV  Antibiotics:Zosyn, and Azithromycin   Diet:  Regular  DVT Prophylaxis:  Lovenox  GI Prophylaxis:  None  Fluids:  LR at 125 cc/hour      Disposition: Loreta Keith is a 53 y.o. female who is admitted for sepsis secondary to influenza B and ear infection which failed outpatient antibiotic therapy.  Discharge home once medically stable.    Annie Noel, MS3  [employer]

## 2024-02-10 PROBLEM — R51.9 NONINTRACTABLE HEADACHE: Status: ACTIVE | Noted: 2024-02-10

## 2024-02-10 PROBLEM — H66.90 OTITIS MEDIA: Status: ACTIVE | Noted: 2024-02-10

## 2024-02-10 LAB
ALBUMIN SERPL-MCNC: 2.5 G/DL (ref 3.5–5)
ALBUMIN/GLOB SERPL: 0.8 RATIO (ref 1.1–2)
ALP SERPL-CCNC: 112 UNIT/L (ref 40–150)
ALT SERPL-CCNC: 128 UNIT/L (ref 0–55)
AST SERPL-CCNC: 38 UNIT/L (ref 5–34)
BASOPHILS # BLD AUTO: 0.05 X10(3)/MCL
BASOPHILS NFR BLD AUTO: 0.6 %
BILIRUB SERPL-MCNC: 0.9 MG/DL
BUN SERPL-MCNC: 7.6 MG/DL (ref 9.8–20.1)
CALCIUM SERPL-MCNC: 7.9 MG/DL (ref 8.4–10.2)
CHLORIDE SERPL-SCNC: 111 MMOL/L (ref 98–107)
CO2 SERPL-SCNC: 23 MMOL/L (ref 22–29)
CREAT SERPL-MCNC: 0.94 MG/DL (ref 0.55–1.02)
CRP SERPL-MCNC: 104.6 MG/L
EOSINOPHIL # BLD AUTO: 0.01 X10(3)/MCL (ref 0–0.9)
EOSINOPHIL NFR BLD AUTO: 0.1 %
ERYTHROCYTE [DISTWIDTH] IN BLOOD BY AUTOMATED COUNT: 13.7 % (ref 11.5–17)
ERYTHROCYTE [SEDIMENTATION RATE] IN BLOOD: 27 MM/HR (ref 0–20)
GFR SERPLBLD CREATININE-BSD FMLA CKD-EPI: >60 MLS/MIN/1.73/M2
GLOBULIN SER-MCNC: 3 GM/DL (ref 2.4–3.5)
GLUCOSE SERPL-MCNC: 95 MG/DL (ref 74–100)
HCT VFR BLD AUTO: 31.7 % (ref 37–47)
HGB BLD-MCNC: 10.3 G/DL (ref 12–16)
IMM GRANULOCYTES # BLD AUTO: 0.18 X10(3)/MCL (ref 0–0.04)
IMM GRANULOCYTES NFR BLD AUTO: 2 %
LYMPHOCYTES # BLD AUTO: 1.18 X10(3)/MCL (ref 0.6–4.6)
LYMPHOCYTES NFR BLD AUTO: 13.2 %
MCH RBC QN AUTO: 29.9 PG (ref 27–31)
MCHC RBC AUTO-ENTMCNC: 32.5 G/DL (ref 33–36)
MCV RBC AUTO: 92.2 FL (ref 80–94)
MONOCYTES # BLD AUTO: 0.53 X10(3)/MCL (ref 0.1–1.3)
MONOCYTES NFR BLD AUTO: 5.9 %
NEUTROPHILS # BLD AUTO: 6.98 X10(3)/MCL (ref 2.1–9.2)
NEUTROPHILS NFR BLD AUTO: 78.2 %
NRBC BLD AUTO-RTO: 0 %
PLATELET # BLD AUTO: 147 X10(3)/MCL (ref 130–400)
PMV BLD AUTO: 11.8 FL (ref 7.4–10.4)
POTASSIUM SERPL-SCNC: 3.8 MMOL/L (ref 3.5–5.1)
PROT SERPL-MCNC: 5.5 GM/DL (ref 6.4–8.3)
RBC # BLD AUTO: 3.44 X10(6)/MCL (ref 4.2–5.4)
SODIUM SERPL-SCNC: 139 MMOL/L (ref 136–145)
WBC # SPEC AUTO: 8.93 X10(3)/MCL (ref 4.5–11.5)

## 2024-02-10 PROCEDURE — 63600175 PHARM REV CODE 636 W HCPCS: Performed by: INTERNAL MEDICINE

## 2024-02-10 PROCEDURE — 63600175 PHARM REV CODE 636 W HCPCS

## 2024-02-10 PROCEDURE — 25000242 PHARM REV CODE 250 ALT 637 W/ HCPCS

## 2024-02-10 PROCEDURE — 25000003 PHARM REV CODE 250

## 2024-02-10 PROCEDURE — 25000003 PHARM REV CODE 250: Performed by: STUDENT IN AN ORGANIZED HEALTH CARE EDUCATION/TRAINING PROGRAM

## 2024-02-10 PROCEDURE — 86140 C-REACTIVE PROTEIN: CPT

## 2024-02-10 PROCEDURE — 80053 COMPREHEN METABOLIC PANEL: CPT

## 2024-02-10 PROCEDURE — 21400001 HC TELEMETRY ROOM

## 2024-02-10 PROCEDURE — 94761 N-INVAS EAR/PLS OXIMETRY MLT: CPT

## 2024-02-10 PROCEDURE — 27000207 HC ISOLATION

## 2024-02-10 PROCEDURE — 85025 COMPLETE CBC W/AUTO DIFF WBC: CPT

## 2024-02-10 PROCEDURE — 85652 RBC SED RATE AUTOMATED: CPT

## 2024-02-10 PROCEDURE — 93005 ELECTROCARDIOGRAM TRACING: CPT

## 2024-02-10 PROCEDURE — 25000003 PHARM REV CODE 250: Performed by: INTERNAL MEDICINE

## 2024-02-10 PROCEDURE — 63700000 PHARM REV CODE 250 ALT 637 W/O HCPCS

## 2024-02-10 RX ORDER — BUTALBITAL, ACETAMINOPHEN AND CAFFEINE 50; 325; 40 MG/1; MG/1; MG/1
1 TABLET ORAL ONCE
Status: COMPLETED | OUTPATIENT
Start: 2024-02-10 | End: 2024-02-10

## 2024-02-10 RX ORDER — FLUTICASONE PROPIONATE 50 MCG
2 SPRAY, SUSPENSION (ML) NASAL 2 TIMES DAILY
Status: DISCONTINUED | OUTPATIENT
Start: 2024-02-10 | End: 2024-02-12 | Stop reason: HOSPADM

## 2024-02-10 RX ORDER — ONDANSETRON HYDROCHLORIDE 2 MG/ML
8 INJECTION, SOLUTION INTRAVENOUS EVERY 8 HOURS PRN
Status: DISCONTINUED | OUTPATIENT
Start: 2024-02-10 | End: 2024-02-12 | Stop reason: HOSPADM

## 2024-02-10 RX ORDER — BUTALBITAL, ACETAMINOPHEN AND CAFFEINE 50; 325; 40 MG/1; MG/1; MG/1
1 TABLET ORAL EVERY 4 HOURS PRN
Status: DISCONTINUED | OUTPATIENT
Start: 2024-02-10 | End: 2024-02-12 | Stop reason: HOSPADM

## 2024-02-10 RX ORDER — KETOROLAC TROMETHAMINE 30 MG/ML
30 INJECTION, SOLUTION INTRAMUSCULAR; INTRAVENOUS ONCE
Status: DISCONTINUED | OUTPATIENT
Start: 2024-02-10 | End: 2024-02-12 | Stop reason: HOSPADM

## 2024-02-10 RX ADMIN — IBUPROFEN 400 MG: 400 TABLET ORAL at 06:02

## 2024-02-10 RX ADMIN — HYPROMELLOSE 2910 1 DROP: 5 SOLUTION/ DROPS OPHTHALMIC at 12:02

## 2024-02-10 RX ADMIN — SODIUM CHLORIDE, POTASSIUM CHLORIDE, SODIUM LACTATE AND CALCIUM CHLORIDE: 600; 310; 30; 20 INJECTION, SOLUTION INTRAVENOUS at 09:02

## 2024-02-10 RX ADMIN — OSELTAMAVIR PHOSPHATE 75 MG: 75 CAPSULE ORAL at 08:02

## 2024-02-10 RX ADMIN — BUTALBITAL, ACETAMINOPHEN, AND CAFFEINE 1 TABLET: 50; 325; 40 TABLET ORAL at 05:02

## 2024-02-10 RX ADMIN — PIPERACILLIN AND TAZOBACTAM 4.5 G: 4; .5 INJECTION, POWDER, LYOPHILIZED, FOR SOLUTION INTRAVENOUS; PARENTERAL at 04:02

## 2024-02-10 RX ADMIN — BUTALBITAL, ACETAMINOPHEN, AND CAFFEINE 1 TABLET: 50; 325; 40 TABLET ORAL at 09:02

## 2024-02-10 RX ADMIN — OSELTAMAVIR PHOSPHATE 75 MG: 75 CAPSULE ORAL at 09:02

## 2024-02-10 RX ADMIN — PIPERACILLIN AND TAZOBACTAM 4.5 G: 4; .5 INJECTION, POWDER, LYOPHILIZED, FOR SOLUTION INTRAVENOUS; PARENTERAL at 01:02

## 2024-02-10 RX ADMIN — IBUPROFEN 400 MG: 400 TABLET ORAL at 04:02

## 2024-02-10 RX ADMIN — AZITHROMYCIN DIHYDRATE 250 MG: 250 TABLET ORAL at 09:02

## 2024-02-10 RX ADMIN — PIPERACILLIN AND TAZOBACTAM 4.5 G: 4; .5 INJECTION, POWDER, LYOPHILIZED, FOR SOLUTION INTRAVENOUS; PARENTERAL at 09:02

## 2024-02-10 RX ADMIN — ENOXAPARIN SODIUM 40 MG: 40 INJECTION SUBCUTANEOUS at 04:02

## 2024-02-10 RX ADMIN — SODIUM CHLORIDE 125 MG: 9 INJECTION, SOLUTION INTRAVENOUS at 06:02

## 2024-02-10 RX ADMIN — ACETAMINOPHEN 1000 MG: 500 TABLET ORAL at 01:02

## 2024-02-10 RX ADMIN — FLUTICASONE PROPIONATE 100 MCG: 50 SPRAY, METERED NASAL at 11:02

## 2024-02-10 RX ADMIN — FLUTICASONE PROPIONATE 100 MCG: 50 SPRAY, METERED NASAL at 08:02

## 2024-02-10 NOTE — PROGRESS NOTES
[facility] Progress Note    Patient Name: Loreta Keith  MRN: [id number]  Admission Date: 2/7/2024  Hospital Length of Stay: 2 days  Code Status: Full Code  Attending Provider: Silvia Lazo MD  Primary Care Provider: Isha, Primary Doctor     Subjective:      Brief HPI:  Loreta Keith is a 53 y.o. female who with no significant past medical history who presented to [facility] ED on 2/7/2024  with complaint of Flu symptoms and AMS.  Patient is poor historian, she told ED physician that her complaints started last week, though told me that it started on lucero, with nasal congestion, non-productive cough, fevers, right ear pain, and fatigue, she went to an urgent care 2 days ago and was started on levaquin, prednisone, and antibiotic ear drops, she said she was disoriented and called her friend to take her to the ED, when she first arrived she was lethargic, dehydrated and ill appearing, she started becoming hypotensive with MAP down to 55, she would respond to fluid resuscitation, but would go back down after stopping the fluids.  Patient denies smoking, alcohol, or drugs, denies any medical history or chronic medical treatment.  ED lab:  Leukocytosis 15.71, normocytic anemia H/H 11.1/33.3, D-dimer >= 20, BUN/creatinine 24/1.4 no baseline, acidotic CO2 19, AST//309, hyponatremia 135,, hypocalcemia 6.7 corrected to 8, hypomagnesemia 1.4, CPK 1300, x-ray unremarkable, CT a chest no PE with small areas of ground-glass in the left lower lobe may be mild infection or inflammation. Hospital medicine was consulted for influenza with AMS and possible sepsis 2/2 PNA      Interval History:      NAEON. Complains of headache behind her eyes to the right temporal region down right side of neck. This moorning behind her eyes and right temporal region. Reports an episode of vomiting this morning. Poor oral in take due to headache. Denies fever, chills, jaw pain, chest pain, and abdominal pain.    Review of  Systems:  The remainder of the 14 point ROS is noncontributory or negative unless mentioned/reviewed above.     Objective:     Vital Signs:  Vital Signs (Most Recent):  Temp: 99.5 °F (37.5 °C) (02/10/24 0748)  Pulse: (!) 59 (02/10/24 0748)  Resp: 18 (02/10/24 0705)  BP: 118/73 (02/10/24 0748)  SpO2: 96 % (02/10/24 0748)  Body mass index is 31.28 kg/m².  Weight: 90.6 kg (199 lb 11.8 oz) Vital Signs (24h Range):  Temp:  [98 °F (36.7 °C)-99.8 °F (37.7 °C)] 99.5 °F (37.5 °C)  Pulse:  [59-71] 59  Resp:  [18] 18  SpO2:  [96 %-98 %] 96 %  BP: (110-118)/(69-85) 118/73       Input/output:     Intake/Output Summary (Last 24 hours) at 2/10/2024 0854  Last data filed at 2/10/2024 0626  Gross per 24 hour   Intake 2495.34 ml   Output --   Net 2495.34 ml       Physical Examination:  General:  Flushed appearance on bilateral cheeks, well developed, well nourished, no acute respiratory distress  Head: Normocephalic, atraumatic.  Crusting and drainage noted in right external ear.  Difficult to visualize tympanic membrane  Eyes: PERRL, anicteric sclera  Throat: No posterior pharyngeal erythema or exudate, no tonsillar exudate  Neck: supple, normal ROM, no JVD  CVS:  RRR, S1 and S2 normal, no murmurs, no added heart sounds, rubs, gallops, regular peripheral pulses, and no peripheral edema  Resp:  Lungs clear to auscultation bilaterally, no wheezes, rales, or rhonci  GI:  Abdomen soft, non-tender, non-distended, normoactive bowel sounds  MSK:  Full range of motion, no obvious deformities   Skin:  No rashes, ulcers, erythema  Neuro:  Alert and oriented x3, No focal neuro deficits, CNII-XII grossly intact  Psych:  Appropriate mood and affect       Lines/Drains/Airways       None                    Laboratory:    Recent Labs   Lab 02/08/24  5414 02/09/24  0330 02/10/24  0335   WBC 10.98 10.60 8.93   RBC 3.89* 3.48* 3.44*   HGB 11.9* 10.7* 10.3*   HCT 35.3* 32.3* 31.7*   MCV 90.7 92.8 92.2   MCH 30.6 30.7 29.9   MCHC 33.7 33.1 32.5*   RDW  13.5 13.7 13.7    130 147   MPV 11.4* 11.7* 11.8*      Recent Labs   Lab 02/07/24  1447 02/07/24  1923 02/08/24  0459 02/09/24  0330 02/10/24  0335   NA  --   --  140 140 139   K  --   --  4.0 3.8 3.8   CHLORIDE  --   --  114* 112* 111*   CO2  --   --  19* 23 23   BUN  --   --  14.1 9.9 7.6*   CREATININE  --   --  1.02 1.00 0.94   CALCIUM  --   --  7.8* 7.7* 7.9*   PH  --  7.380  --   --   --    MG 1.40*  --  2.30  --   --    ALBUMIN  --   --  2.6* 2.6* 2.5*   ALKPHOS  --   --  92 91 112   ALT  --   --  260* 177* 128*   AST  --   --  139* 63* 38*   BILITOT  --   --  1.0 0.7 0.9        Other Results:  Estimated Creatinine Clearance: 81.8 mL/min (based on SCr of 0.94 mg/dL).    Current Medications:     Infusions:   lactated ringers 125 mL/hr at 02/10/24 0626         Scheduled:   azithromycin  250 mg Oral Daily    butalbital-acetaminophen-caffeine -40 mg  1 tablet Oral Once    enoxparin  40 mg Subcutaneous Daily    ketorolac  30 mg Intravenous Once    oseltamivir  75 mg Oral BID    piperacillin-tazobactam (Zosyn) IV (PEDS and ADULTS) (extended infusion is not appropriate)  4.5 g Intravenous Q8H         PRN:   acetaminophen    acetaminophen    artificial tears    dextrose 10%    dextrose 10%    diphenhydrAMINE    glucagon (human recombinant)    glucose    glucose    ibuprofen    naloxone    ondansetron    sodium chloride 0.9%        Microbiology Data:  Microbiology Results (last 7 days)       Procedure Component Value Units Date/Time    Blood culture x two cultures. Draw prior to antibiotics. [[id number]]  (Abnormal) Collected: 02/07/24 1438    Order Status: Completed Specimen: Blood from Arm, Left Updated: 02/10/24 0706     CULTURE, BLOOD ([facility]) Gram-positive coccus probable strep     GRAM STAIN Gram Positive Cocci, probable Streptococcus      Seen in gram stain of broth only      1 of 2 Anaerobic bottles positive    Blood culture x two cultures. Draw prior to antibiotics. [[id number]]  (Normal) Collected:  02/07/24 1438    Order Status: Completed Specimen: Blood from Arm, Right Updated: 02/09/24 1900     CULTURE, BLOOD ([facility]) No Growth At 48 Hours    Blood Culture [[id number]] Collected: 02/09/24 1237    Order Status: Resulted Specimen: Blood from Antecubital, Right Updated: 02/09/24 1248    Blood Culture [[id number]] Collected: 02/09/24 1237    Order Status: Resulted Specimen: Blood from Hand, Right Updated: 02/09/24 1248    BCID2 Panel [[id number]]  (Abnormal) Collected: 02/07/24 1438    Order Status: Completed Specimen: Blood from Arm, Left Updated: 02/09/24 1156     CTX-M (ESBL ) N/A     IMP (Cabapenemase ) N/A     KPC resistance gene (Carbapenemase ) N/A     mcr-1 N/A     mecA ID N/A     Comment: Note: Antimicrobial resistance can occur via multiple mechanisms. A Not Detected result for antimicrobial resistance gene(s) does not indicate antimicrobial susceptibility. Subculturing is required for species identification and susceptibility testing of   isolates.        mecA/C and MREJ (MRSA) gene N/A     NDM (Carbapenemase ) N/A     OXA-48-like (Carbapenemase ) N/A     Heena/B (VRE gene) N/A     VIM (Carbapenemase ) N/A     Enterococcus faecalis Not Detected     Enterococcus faecium Not Detected     Listeria monocytogenes Not Detected     Staphylococcus spp. Not Detected     Staphylococcus aureus Not Detected     Staphylococcus epidermidis Not Detected     Staphylococcus lugdunensis Not Detected     Streptococcus spp. Detected     Streptococcus agalactiae (Group B) Not Detected     Streptococcus pneumoniae Detected     Streptococcus pyogenes (Group A) Not Detected     Acinetobacter calcoaceticus/baumannii complex Not Detected     Bacteroides fragilis Not Detected     Enterobacterales Not Detected     Enterobacter cloacae complex Not Detected     Escherichia coli Not Detected     Klebsiella aerogenes Not Detected     Klebsiella oxytoca Not Detected     Klebsiella  pneumoniae group Not Detected     Proteus spp. Not Detected     Salmonella spp. Not Detected     Serratia marcescens Not Detected     Haemophilus influenzae Not Detected     Neisseria meningitidis Not Detected     Pseudomonas aeruginosa Not Detected     Stenotrophomonas maltophilia Not Detected     Candida albicans Not Detected     Candida auris Not Detected     Candida glabrata Not Detected     Candida krusei Not Detected     Candida parapsilosis Not Detected     Candida tropicalis Not Detected     Cryptococcus neoformans/gattii Not Detected    Narrative:      The [employer] BCID2 Panel is a multiplexed nucleic acid test intended for the use with registracija vozila.0 or videof.me Systems for the simultaneous qualitative detection and identification of multiple bacterial and yeast nucleic acids and select genetic determinants associated with antimicrobial resistance.  The [employer] BCID2 Panel test is performed directly on blood culture samples identified as positive by a continuous monitoring blood culture system.  Results are intended to be interpreted in conjunction with Gram stain results.    Urine culture [[id number]] Collected: 02/07/24 1415    Order Status: Completed Specimen: Urine Updated: 02/09/24 0756     Urine Culture No Significant Growth    Respiratory Culture [[id number]] Collected: 02/08/24 0114    Order Status: Sent Specimen: Sputum from Nasal Swab              Antibiotics and Day Number of Therapy:  Antibiotics (From admission, onward)      Start     Stop Route Frequency Ordered    02/09/24 0900  azithromycin tablet 250 mg         02/13/24 0859 Oral Daily 02/08/24 1138    02/09/24 0900  piperacillin-tazobactam (ZOSYN) 4.5 g in dextrose 5 % in water (D5W) 100 mL IVPB (MB+)         03/09/24 0059 IV Every 8 hours (non-standard times) 02/09/24 0110             Imaging:  CTA Chest Non-Coronary (PE Studies)  Narrative: EXAMINATION:  CTA CHEST NON CORONARY (PE STUDIES)    CLINICAL  HISTORY:  Pulmonary embolism (PE) suspected, positive D-dimer;    TECHNIQUE:  CTA imaging of the chest after IV contrast. Axial, coronal and sagittal reconstructions, including MIP images, are reviewed. Dose length product 194 mGycm. Automatic exposure control, adjustment of mA/kV or iterative reconstruction technique used to limit radiation dose.    COMPARISON:  No relevant comparison studies available at the time of dictation.    FINDINGS:  Diagnostic quality: Adequate    Pulmonary embolism: None identified.    Lung parenchyma: Mild motion, but few small areas of ground-glass identified within the left lower lobe.    Pleural effusion: None.    Mediastinum/loraine: Normal heart size and thoracic aortic caliber.  No pathologically enlarged lymph node.    Chest wall/axilla: No significant findings.    Upper abdomen: No significant findings.    Bones: No acute osseous process.  Impression: No pulmonary embolism identified.    Few small areas of ground-glass in the left lower lobe may be mild infection or inflammation.    Electronically signed by: Freddie Pandya  Date:    02/07/2024  Time:    17:55  X-Ray Chest AP Portable  Narrative: EXAMINATION:  XR CHEST AP PORTABLE    CLINICAL HISTORY:  Sepsis;    TECHNIQUE:  Single view of the chest    COMPARISON:  No prior imaging available for comparison.    FINDINGS:  No focal opacification, pleural effusion, or pneumothorax.    The cardiomediastinal silhouette is within normal limits.    No acute osseous abnormality.  Impression: No acute cardiopulmonary process.    Electronically signed by: Shay Gregory  Date:    02/07/2024  Time:    14:36        Assessment & Plan:   Headache  - ESR 27 and . Trend ESR and CRP  - reviewed imaging with ENT and no concern for mastoiditis but there is fluid in her right eat.  - will give a does of Fioricet and revevaluate headache. If it does not improve will need to order CT head. If CT head is negative will give a dose of prednisone 40 mg  for concern of GCA. MRA and temporal artery US not available over the weekend.  - if patient does not improve after above workup would consider MRI Internal auditory Canal    Otitis Media  PNA  Influenza B  Influenza B positive, order Tamiflu 75 b.i.d. (day 4/5)  continue Zosyn, deescalate for culture  Otitis media covered with Zosyn, deescalate to amoxicillin if cultures are negative  Respiratory panel and cultures  negative  azithromycin for atypical pneumonia coverage ( day 3).  CT temporal bone to rule out mastoiditis.  Reviewed imaging with ENT and does not think it is otitis media. Will await further recommendations  Blood culture 1/2 bottles positive BC ID showing strep pneumoniae. Sensitivities to result in the morning on 2/11/2024 Repeat cultures pending.          Transaminitis  Patient denies alcohol drinking or chronic medication use  Most likely due to stress in the setting of sepsis  Hepatitis panel negative  Follow up with daily labs     Normocytic anemia  H/H 11.1/33.3  Ordered iron panel-consistent with chronic disease  IV iron ordered. Will need PO iron every other day  B12 and folate WNL    KIRBY ( resolved)  Admission BUN/creatinine 24/1.4, no baseline  Most likely prerenal azotemia in the setting of dehydration        CODE STATUS: Full  Access: PIV  Antibiotics: azithro (day 3) and Zosyn (day 3)  Diet:  Regular  DVT Prophylaxis:  Lovenox  GI Prophylaxis:  None  Fluids:  cc/h      Disposition: Loreta Keith is a 53 y.o. female who is admitted for sepsis secondary to influenza B and ear infection which failed outpatient antibiotic therapy.  Initial blood culture positive for strep.  Repeat cultures pending.  ENT recommendations      Pratik Wilson MD  Internal Medicine Resident PGY-III  02/10/2024

## 2024-02-10 NOTE — CONSULTS
"Otolaryngology - Consult Note    Patient Name: Loreta Keith  Encounter Date: 02/10/2024  Date of Admission: 2/7/2024  YOB: 1970  Physician: Sean Wilson      Reason for Consultation: Otalgia, concern for acute otitis media      History of Present Illness: Loreta Keith is a 53 y.o. female admitted 2/7/24 with hypotension, anemia, and lethargy. She reportedly had been feeling cold like symptoms and right sided otalgia. She also had green drainage from her right ear earlier in the week. This prompted her to go to an Urgent Care on 2/5/24. She received antibiotics and steroids but does not think that she had taken many doses, if any, because she shortly afterwards became confused and disoriented. She was found to be positive for influenza B and had CT chest with mild ground glass opacities. She was started on Zosyn and azithromycin. Blood cultures were drawn which preliminarily suggest Strep pneumo. She continues to have severe right sided headaches ("27 out of 10") with associated retro-orbital pain, temporal pain, and radiation to her neck. She does admit a history of migraines but is not on any daily medication for this. She denies photophobia or phonophobia explicitly. She has been nauseous before but not this morning. She still complains of aural fullness, aural pressure, and muffled hearing. She denies tinnitus, otorrhea over the last 24 hours, or vertigo. She denies previous ear surgeries but did have "sinus surgery" and a septoplasty in 2016 which helped her symptoms. ENT consulted for persistent headaches and otalgia in the setting of previous sepsis.     History reviewed. No pertinent past medical history.    No current outpatient medications    History reviewed. No pertinent surgical history.    Review of patient's allergies indicates:   Allergen Reactions    Vancomycin analogues Rash     Red Man Syndrome; administer slowly           Objective:  Vitals:    02/10/24 0426 02/10/24 0705 " 02/10/24 0742 02/10/24 0748   BP:  114/76  118/73   Pulse:  (!) 59  (!) 59   Resp:  18     Temp:  99.8 °F (37.7 °C)  99.5 °F (37.5 °C)   TempSrc:  Oral  Oral   SpO2: 97% 96% 96% 96%   Weight:       Height:           General Appearance: well nourished, well-developed, alert, oriented, in moderate distress secondary to cephalgia, no dysphonia  Head/Face: Normocephalic, atraumatic  Eyes: EOMI, normal conjunctiva  Ears: Hears well at normal conversation volume  AD: external normal. Ear is not proptotic, no skin changes or erythema over mastoid. Mild tenderness to TMJ but not to mastoid itself. Ear canal normal, TM intact but with small area over central aspect of drum with some residual granulation tissue. Dense mucoid effusion/purulence behind TM but no bulging of the drum.   AS: external normal, ear canal occluded with cerumen, unable to visualize TM  Nose: External nose normal, septum midline but with anterior septal perforation with significant crusting.  Oral Cavity & Oropharynx: Lips normal. Tongue without masses or lesions. Dentition appropriate for age. Oropharynx unremarkable, tonsils 1+, no post nasal drip. No masses, lesions, or leukoplakia. Floor of mouth and base of tongue are soft.   Neck: Soft, non-tender, no palpable lymph nodes. Thyroid without nodules or goiter.   Neuro: CN II - XII intact. Brudzinski's sign negative  Psychiatric: oriented to time, place and person, no depression, anxiety or agitation      Labs & Imaging    CT Temporal Bone without contrast 2/9/24: I have personally reviewed this image. On the left, the middle ear and mastoid are well aerated without any evidence of fluid. Ossicles intact and in appropriate position. Some soft tissue in EAC; likely cerumen. On the right, there is soft tissue in the middle ear and in the mastoid cavity suggestive of fluid. However, the mastoid is only partially opacified and there are several patent air cells. Very small area of dehiscence of tegmen  tympani but this could be related to imaging artifact. No subperiosteal abscess or coalescent mastoiditis. There is mucosal thickening of  the bilateral frontal, maxillary, and ethmoid sinuses. Evidence of septal perforation anteriorly.     CT Chest 2/7/24: I have personally reviewed this image. There are some areas of ground glass opacity in the lower lobes bilaterally.     Assessment: Loreta Keith is a 53 y.o. female admitted 2/7/24 with altered mental status, sepsis, and KIRBY with preliminary blood cultures sugesstive of Streptococcus pneumoniae. She has persistent otalgia and headaches which could have a number of etiologies. She likely had an episode of acute otitis media with spontaneous perforation of her eardrum, but it does appear to be healing on examination today. Furthermore, she does have fluid in her middle ear and mastoid consistent with acute otitis media, but suspicion for acute mastoiditis is very low based on examination and imaging. Her middle ear/mastoid effusion could also be related to her acute viral rhinosinusitis causing inflammation of her upper respiratory mucosa and resulting eustachian tube dysfunction. Furthermore, she does have a history of migraines and her headaches may have a component of this. Finally, bacterial meningitis is also on the differential (presumably with spread from her right otitis media), albeit much lower on the differential given several other more likely etiologies for her symptoms.       Plan:   - Agree with systemic antibiotics with coverage for S. Pneumoniae. Agree with deescalating once speciation and susceptibilities return  - I do not think ototopical drops will help her symptoms at this point as she does not have a TM perforation (if she had one previously, it now appears to have closed already)   - Recommend nasal saline q4h while awake, Flonase BID, and Bactroban ointment to both nostrils BID to help with nasal crusting and acute viral  rhinosinusitis  - Agree with management of likely migraines and trial of fiorocet  - If she does not experience any improvement on the above could consider lumbar puncture +/- MRI internal auditory canals to rule out meningitis  - ENT will follow peripherally, please do not hesitate to call with any questions or concerns in the meantime    Sean Wilson  [employer] Otolaryngology PGY-4  02/10/2024 10:27 AM

## 2024-02-11 LAB
ALBUMIN SERPL-MCNC: 2.4 G/DL (ref 3.5–5)
ALBUMIN/GLOB SERPL: 0.8 RATIO (ref 1.1–2)
ALP SERPL-CCNC: 125 UNIT/L (ref 40–150)
ALT SERPL-CCNC: 101 UNIT/L (ref 0–55)
AST SERPL-CCNC: 34 UNIT/L (ref 5–34)
BACTERIA BLD CULT: ABNORMAL
BASOPHILS # BLD AUTO: 0.04 X10(3)/MCL
BASOPHILS NFR BLD AUTO: 0.4 %
BILIRUB SERPL-MCNC: 0.7 MG/DL
BUN SERPL-MCNC: 8.7 MG/DL (ref 9.8–20.1)
CALCIUM SERPL-MCNC: 8 MG/DL (ref 8.4–10.2)
CHLORIDE SERPL-SCNC: 110 MMOL/L (ref 98–107)
CO2 SERPL-SCNC: 23 MMOL/L (ref 22–29)
CREAT SERPL-MCNC: 0.86 MG/DL (ref 0.55–1.02)
CRP SERPL-MCNC: 56.2 MG/L
EOSINOPHIL # BLD AUTO: 0.03 X10(3)/MCL (ref 0–0.9)
EOSINOPHIL NFR BLD AUTO: 0.3 %
ERYTHROCYTE [DISTWIDTH] IN BLOOD BY AUTOMATED COUNT: 13.5 % (ref 11.5–17)
ERYTHROCYTE [SEDIMENTATION RATE] IN BLOOD: 36 MM/HR (ref 0–20)
GFR SERPLBLD CREATININE-BSD FMLA CKD-EPI: >60 MLS/MIN/1.73/M2
GLOBULIN SER-MCNC: 3 GM/DL (ref 2.4–3.5)
GLUCOSE SERPL-MCNC: 90 MG/DL (ref 74–100)
GRAM STN SPEC: ABNORMAL
HCT VFR BLD AUTO: 30.6 % (ref 37–47)
HGB BLD-MCNC: 10 G/DL (ref 12–16)
IMM GRANULOCYTES # BLD AUTO: 0.44 X10(3)/MCL (ref 0–0.04)
IMM GRANULOCYTES NFR BLD AUTO: 4.7 %
LYMPHOCYTES # BLD AUTO: 2.04 X10(3)/MCL (ref 0.6–4.6)
LYMPHOCYTES NFR BLD AUTO: 21.7 %
MCH RBC QN AUTO: 29.9 PG (ref 27–31)
MCHC RBC AUTO-ENTMCNC: 32.7 G/DL (ref 33–36)
MCV RBC AUTO: 91.6 FL (ref 80–94)
MONOCYTES # BLD AUTO: 0.8 X10(3)/MCL (ref 0.1–1.3)
MONOCYTES NFR BLD AUTO: 8.5 %
NEUTROPHILS # BLD AUTO: 6.06 X10(3)/MCL (ref 2.1–9.2)
NEUTROPHILS NFR BLD AUTO: 64.4 %
NRBC BLD AUTO-RTO: 0 %
PLATELET # BLD AUTO: 180 X10(3)/MCL (ref 130–400)
PMV BLD AUTO: 11.3 FL (ref 7.4–10.4)
POTASSIUM SERPL-SCNC: 3.7 MMOL/L (ref 3.5–5.1)
PROT SERPL-MCNC: 5.4 GM/DL (ref 6.4–8.3)
RBC # BLD AUTO: 3.34 X10(6)/MCL (ref 4.2–5.4)
SODIUM SERPL-SCNC: 139 MMOL/L (ref 136–145)
WBC # SPEC AUTO: 9.41 X10(3)/MCL (ref 4.5–11.5)

## 2024-02-11 PROCEDURE — 63600175 PHARM REV CODE 636 W HCPCS: Performed by: INTERNAL MEDICINE

## 2024-02-11 PROCEDURE — 80053 COMPREHEN METABOLIC PANEL: CPT

## 2024-02-11 PROCEDURE — 25000003 PHARM REV CODE 250: Performed by: INTERNAL MEDICINE

## 2024-02-11 PROCEDURE — 25000003 PHARM REV CODE 250

## 2024-02-11 PROCEDURE — 85652 RBC SED RATE AUTOMATED: CPT

## 2024-02-11 PROCEDURE — 85025 COMPLETE CBC W/AUTO DIFF WBC: CPT

## 2024-02-11 PROCEDURE — 27000207 HC ISOLATION

## 2024-02-11 PROCEDURE — 21400001 HC TELEMETRY ROOM

## 2024-02-11 PROCEDURE — 63700000 PHARM REV CODE 250 ALT 637 W/O HCPCS

## 2024-02-11 PROCEDURE — 63600175 PHARM REV CODE 636 W HCPCS

## 2024-02-11 PROCEDURE — 25000003 PHARM REV CODE 250: Performed by: STUDENT IN AN ORGANIZED HEALTH CARE EDUCATION/TRAINING PROGRAM

## 2024-02-11 PROCEDURE — 94761 N-INVAS EAR/PLS OXIMETRY MLT: CPT

## 2024-02-11 PROCEDURE — 86140 C-REACTIVE PROTEIN: CPT

## 2024-02-11 RX ORDER — MUPIROCIN 20 MG/G
OINTMENT TOPICAL 2 TIMES DAILY
Status: DISCONTINUED | OUTPATIENT
Start: 2024-02-11 | End: 2024-02-12 | Stop reason: HOSPADM

## 2024-02-11 RX ORDER — OSELTAMIVIR PHOSPHATE 75 MG/1
75 CAPSULE ORAL 2 TIMES DAILY
Qty: 2 CAPSULE | Refills: 0 | Status: SHIPPED | OUTPATIENT
Start: 2024-02-11 | End: 2024-02-12 | Stop reason: HOSPADM

## 2024-02-11 RX ORDER — BUTALBITAL, ACETAMINOPHEN AND CAFFEINE 50; 325; 40 MG/1; MG/1; MG/1
1 TABLET ORAL EVERY 4 HOURS PRN
Qty: 15 TABLET | Refills: 0 | Status: SHIPPED | OUTPATIENT
Start: 2024-02-11 | End: 2024-03-12

## 2024-02-11 RX ORDER — FLUTICASONE PROPIONATE 50 MCG
2 SPRAY, SUSPENSION (ML) NASAL 2 TIMES DAILY
Qty: 18.2 ML | Refills: 0 | Status: SHIPPED | OUTPATIENT
Start: 2024-02-11

## 2024-02-11 RX ADMIN — PIPERACILLIN AND TAZOBACTAM 4.5 G: 4; .5 INJECTION, POWDER, LYOPHILIZED, FOR SOLUTION INTRAVENOUS; PARENTERAL at 05:02

## 2024-02-11 RX ADMIN — SALINE NASAL SPRAY 2 SPRAY: 1.5 SOLUTION NASAL at 09:02

## 2024-02-11 RX ADMIN — OSELTAMAVIR PHOSPHATE 75 MG: 75 CAPSULE ORAL at 08:02

## 2024-02-11 RX ADMIN — SODIUM CHLORIDE, POTASSIUM CHLORIDE, SODIUM LACTATE AND CALCIUM CHLORIDE: 600; 310; 30; 20 INJECTION, SOLUTION INTRAVENOUS at 05:02

## 2024-02-11 RX ADMIN — PIPERACILLIN AND TAZOBACTAM 4.5 G: 4; .5 INJECTION, POWDER, LYOPHILIZED, FOR SOLUTION INTRAVENOUS; PARENTERAL at 12:02

## 2024-02-11 RX ADMIN — AZITHROMYCIN DIHYDRATE 250 MG: 250 TABLET ORAL at 08:02

## 2024-02-11 RX ADMIN — BUTALBITAL, ACETAMINOPHEN, AND CAFFEINE 1 TABLET: 50; 325; 40 TABLET ORAL at 11:02

## 2024-02-11 RX ADMIN — MUPIROCIN: 20 OINTMENT TOPICAL at 08:02

## 2024-02-11 RX ADMIN — ENOXAPARIN SODIUM 40 MG: 40 INJECTION SUBCUTANEOUS at 05:02

## 2024-02-11 RX ADMIN — SALINE NASAL SPRAY 2 SPRAY: 1.5 SOLUTION NASAL at 03:02

## 2024-02-11 RX ADMIN — FLUTICASONE PROPIONATE 100 MCG: 50 SPRAY, METERED NASAL at 09:02

## 2024-02-11 RX ADMIN — SODIUM CHLORIDE, POTASSIUM CHLORIDE, SODIUM LACTATE AND CALCIUM CHLORIDE: 600; 310; 30; 20 INJECTION, SOLUTION INTRAVENOUS at 03:02

## 2024-02-11 RX ADMIN — BUTALBITAL, ACETAMINOPHEN, AND CAFFEINE 1 TABLET: 50; 325; 40 TABLET ORAL at 03:02

## 2024-02-11 RX ADMIN — SALINE NASAL SPRAY 2 SPRAY: 1.5 SOLUTION NASAL at 05:02

## 2024-02-11 RX ADMIN — SODIUM CHLORIDE, POTASSIUM CHLORIDE, SODIUM LACTATE AND CALCIUM CHLORIDE: 600; 310; 30; 20 INJECTION, SOLUTION INTRAVENOUS at 10:02

## 2024-02-11 RX ADMIN — BUTALBITAL, ACETAMINOPHEN, AND CAFFEINE 1 TABLET: 50; 325; 40 TABLET ORAL at 08:02

## 2024-02-11 RX ADMIN — BUTALBITAL, ACETAMINOPHEN, AND CAFFEINE 1 TABLET: 50; 325; 40 TABLET ORAL at 12:02

## 2024-02-11 RX ADMIN — SALINE NASAL SPRAY 2 SPRAY: 1.5 SOLUTION NASAL at 10:02

## 2024-02-11 RX ADMIN — PIPERACILLIN AND TAZOBACTAM 4.5 G: 4; .5 INJECTION, POWDER, LYOPHILIZED, FOR SOLUTION INTRAVENOUS; PARENTERAL at 08:02

## 2024-02-11 NOTE — PROGRESS NOTES
[facility] Progress Note    Patient Name: Loreta Keith  MRN: [id number]  Admission Date: 2/7/2024  Hospital Length of Stay: 3 days  Code Status: Full Code  Attending Provider: Silvia Lazo MD  Primary Care Provider: Isha, Primary Doctor     Subjective:      Brief HPI:  Loreta Keith is a 53 y.o. female who with no significant past medical history who presented to [facility] ED on 2/7/2024  with complaint of Flu symptoms and AMS.  Patient is poor historian, she told ED physician that her complaints started last week, though told me that it started on lucero, with nasal congestion, non-productive cough, fevers, right ear pain, and fatigue, she went to an urgent care 2 days ago and was started on levaquin, prednisone, and antibiotic ear drops, she said she was disoriented and called her friend to take her to the ED, when she first arrived she was lethargic, dehydrated and ill appearing, she started becoming hypotensive with MAP down to 55, she would respond to fluid resuscitation, but would go back down after stopping the fluids.  Patient denies smoking, alcohol, or drugs, denies any medical history or chronic medical treatment.  ED lab:  Leukocytosis 15.71, normocytic anemia H/H 11.1/33.3, D-dimer >= 20, BUN/creatinine 24/1.4 no baseline, acidotic CO2 19, AST//309, hyponatremia 135,, hypocalcemia 6.7 corrected to 8, hypomagnesemia 1.4, CPK 1300, x-ray unremarkable, CT a chest no PE with small areas of ground-glass in the left lower lobe may be mild infection or inflammation. Hospital medicine was consulted for influenza with AMS and possible sepsis 2/2 PNA      Interval History:      NAEON. Reports headache is much improved with fiorcet. Remains afebrile. Continues to have cough, congestion. Denies fever, chills, jaw pain, chest pain, and abdominal pain.    Review of Systems:  The remainder of the 14 point ROS is noncontributory or negative unless mentioned/reviewed above.     Objective:     Vital  Signs:  Vital Signs (Most Recent):  Temp: 99.1 °F (37.3 °C) (02/11/24 0831)  Pulse: (!) 56 (02/11/24 0831)  Resp: 20 (02/11/24 0831)  BP: 118/61 (02/11/24 0831)  SpO2: 98 % (02/11/24 0833)  Body mass index is 31.28 kg/m².  Weight: 90.6 kg (199 lb 11.8 oz) Vital Signs (24h Range):  Temp:  [98.3 °F (36.8 °C)-99.9 °F (37.7 °C)] 99.1 °F (37.3 °C)  Pulse:  [55-59] 56  Resp:  [18-22] 20  SpO2:  [96 %-100 %] 98 %  BP: (105-120)/(61-73) 118/61       Input/output:     Intake/Output Summary (Last 24 hours) at 2/11/2024 1044  Last data filed at 2/11/2024 0511  Gross per 24 hour   Intake 1899.61 ml   Output --   Net 1899.61 ml       Physical Examination:  General:  Flushed appearance on bilateral cheeks, well developed, well nourished, no acute respiratory distress  Head: Normocephalic, atraumatic.  Crusting and drainage noted in right external ear.  Difficult to visualize tympanic membrane  Eyes: PERRL, anicteric sclera  Throat: No posterior pharyngeal erythema or exudate, no tonsillar exudate  Neck: supple, normal ROM, no JVD  CVS:  RRR, S1 and S2 normal, no murmurs, no added heart sounds, rubs, gallops, regular peripheral pulses, and no peripheral edema  Resp:  Lungs clear to auscultation bilaterally, no wheezes, rales, or rhonci  GI:  Abdomen soft, non-tender, non-distended, normoactive bowel sounds  MSK:  Full range of motion, no obvious deformities   Skin:  No rashes, ulcers, erythema  Neuro:  Alert and oriented x3, No focal neuro deficits, CNII-XII grossly intact  Psych:  Appropriate mood and affect       Lines/Drains/Airways       None                    Laboratory:    Recent Labs   Lab 02/09/24  0330 02/10/24  0335 02/11/24  0348   WBC 10.60 8.93 9.41   RBC 3.48* 3.44* 3.34*   HGB 10.7* 10.3* 10.0*   HCT 32.3* 31.7* 30.6*   MCV 92.8 92.2 91.6   MCH 30.7 29.9 29.9   MCHC 33.1 32.5* 32.7*   RDW 13.7 13.7 13.5    147 180   MPV 11.7* 11.8* 11.3*      Recent Labs   Lab 02/07/24  1447 02/07/24  1923 02/08/24  0459  02/09/24  0330 02/10/24  0335 02/11/24  0348   NA  --   --  140 140 139 139   K  --   --  4.0 3.8 3.8 3.7   CHLORIDE  --   --  114* 112* 111* 110*   CO2  --   --  19* 23 23 23   BUN  --   --  14.1 9.9 7.6* 8.7*   CREATININE  --   --  1.02 1.00 0.94 0.86   CALCIUM  --   --  7.8* 7.7* 7.9* 8.0*   PH  --  7.380  --   --   --   --    MG 1.40*  --  2.30  --   --   --    ALBUMIN  --   --  2.6* 2.6* 2.5* 2.4*   ALKPHOS  --   --  92 91 112 125   ALT  --   --  260* 177* 128* 101*   AST  --   --  139* 63* 38* 34   BILITOT  --   --  1.0 0.7 0.9 0.7        Other Results:  Estimated Creatinine Clearance: 89.4 mL/min (based on SCr of 0.86 mg/dL).    Current Medications:     Infusions:   lactated ringers 125 mL/hr at 02/11/24 1006         Scheduled:   azithromycin  250 mg Oral Daily    enoxparin  40 mg Subcutaneous Daily    fluticasone propionate  2 spray Each Nostril BID    ketorolac  30 mg Intravenous Once    mupirocin   Topical (Top) BID    oseltamivir  75 mg Oral BID    piperacillin-tazobactam (Zosyn) IV (PEDS and ADULTS) (extended infusion is not appropriate)  4.5 g Intravenous Q8H    sodium chloride  2 spray Each Nostril Q4H While awake         PRN:   artificial tears    butalbital-acetaminophen-caffeine -40 mg    dextrose 10%    dextrose 10%    diphenhydrAMINE    glucagon (human recombinant)    glucose    glucose    ibuprofen    naloxone    ondansetron    sodium chloride    sodium chloride 0.9%        Microbiology Data:  Microbiology Results (last 7 days)       Procedure Component Value Units Date/Time    Blood culture x two cultures. Draw prior to antibiotics. [[id number]]  (Abnormal)  (Susceptibility) Collected: 02/07/24 1438    Order Status: Completed Specimen: Blood from Arm, Left Updated: 02/11/24 0645     CULTURE, BLOOD ([facility]) Streptococcus pneumoniae     GRAM STAIN Gram Positive Cocci, probable Streptococcus      Seen in gram stain of broth only      1 of 2 Anaerobic bottles positive    Blood culture x two  cultures. Draw prior to antibiotics. [[id number]]  (Normal) Collected: 02/07/24 1438    Order Status: Completed Specimen: Blood from Arm, Right Updated: 02/10/24 1900     CULTURE, BLOOD ([facility]) No Growth At 72 Hours    Blood Culture [[id number]]  (Normal) Collected: 02/09/24 1237    Order Status: Completed Specimen: Blood from Antecubital, Right Updated: 02/10/24 1700     CULTURE, BLOOD ([facility]) No Growth At 24 Hours    Blood Culture [[id number]]  (Normal) Collected: 02/09/24 1237    Order Status: Completed Specimen: Blood from Hand, Right Updated: 02/10/24 1700     CULTURE, BLOOD ([facility]) No Growth At 24 Hours    BCID2 Panel [[id number]]  (Abnormal) Collected: 02/07/24 1438    Order Status: Completed Specimen: Blood from Arm, Left Updated: 02/09/24 1156     CTX-M (ESBL ) N/A     IMP (Cabapenemase ) N/A     KPC resistance gene (Carbapenemase ) N/A     mcr-1 N/A     mecA ID N/A     Comment: Note: Antimicrobial resistance can occur via multiple mechanisms. A Not Detected result for antimicrobial resistance gene(s) does not indicate antimicrobial susceptibility. Subculturing is required for species identification and susceptibility testing of   isolates.        mecA/C and MREJ (MRSA) gene N/A     NDM (Carbapenemase ) N/A     OXA-48-like (Carbapenemase ) N/A     Heena/B (VRE gene) N/A     VIM (Carbapenemase ) N/A     Enterococcus faecalis Not Detected     Enterococcus faecium Not Detected     Listeria monocytogenes Not Detected     Staphylococcus spp. Not Detected     Staphylococcus aureus Not Detected     Staphylococcus epidermidis Not Detected     Staphylococcus lugdunensis Not Detected     Streptococcus spp. Detected     Streptococcus agalactiae (Group B) Not Detected     Streptococcus pneumoniae Detected     Streptococcus pyogenes (Group A) Not Detected     Acinetobacter calcoaceticus/baumannii complex Not Detected     Bacteroides fragilis Not Detected     Enterobacterales Not  Detected     Enterobacter cloacae complex Not Detected     Escherichia coli Not Detected     Klebsiella aerogenes Not Detected     Klebsiella oxytoca Not Detected     Klebsiella pneumoniae group Not Detected     Proteus spp. Not Detected     Salmonella spp. Not Detected     Serratia marcescens Not Detected     Haemophilus influenzae Not Detected     Neisseria meningitidis Not Detected     Pseudomonas aeruginosa Not Detected     Stenotrophomonas maltophilia Not Detected     Candida albicans Not Detected     Candida auris Not Detected     Candida glabrata Not Detected     Candida krusei Not Detected     Candida parapsilosis Not Detected     Candida tropicalis Not Detected     Cryptococcus neoformans/gattii Not Detected    Narrative:      The [employer] BCID2 Panel is a multiplexed nucleic acid test intended for the use with [employer]® 2.0 or [employer]® Identia Systems for the simultaneous qualitative detection and identification of multiple bacterial and yeast nucleic acids and select genetic determinants associated with antimicrobial resistance.  The [employer] BCID2 Panel test is performed directly on blood culture samples identified as positive by a continuous monitoring blood culture system.  Results are intended to be interpreted in conjunction with Gram stain results.    Urine culture [[id number]] Collected: 02/07/24 1415    Order Status: Completed Specimen: Urine Updated: 02/09/24 0756     Urine Culture No Significant Growth    Respiratory Culture [[id number]] Collected: 02/08/24 0114    Order Status: Sent Specimen: Sputum from Nasal Swab              Antibiotics and Day Number of Therapy:  Antibiotics (From admission, onward)      Start     Stop Route Frequency Ordered    02/11/24 0900  mupirocin 2 % ointment         -- Top 2 times daily 02/11/24 0745    02/09/24 0900  azithromycin tablet 250 mg         02/13/24 0859 Oral Daily 02/08/24 1138    02/09/24 0900  piperacillin-tazobactam (ZOSYN) 4.5 g in  dextrose 5 % in water (D5W) 100 mL IVPB (MB+)         03/09/24 0059 IV Every 8 hours (non-standard times) 02/09/24 0110             Imaging:  CTA Chest Non-Coronary (PE Studies)  Narrative: EXAMINATION:  CTA CHEST NON CORONARY (PE STUDIES)    CLINICAL HISTORY:  Pulmonary embolism (PE) suspected, positive D-dimer;    TECHNIQUE:  CTA imaging of the chest after IV contrast. Axial, coronal and sagittal reconstructions, including MIP images, are reviewed. Dose length product 194 mGycm. Automatic exposure control, adjustment of mA/kV or iterative reconstruction technique used to limit radiation dose.    COMPARISON:  No relevant comparison studies available at the time of dictation.    FINDINGS:  Diagnostic quality: Adequate    Pulmonary embolism: None identified.    Lung parenchyma: Mild motion, but few small areas of ground-glass identified within the left lower lobe.    Pleural effusion: None.    Mediastinum/loraine: Normal heart size and thoracic aortic caliber.  No pathologically enlarged lymph node.    Chest wall/axilla: No significant findings.    Upper abdomen: No significant findings.    Bones: No acute osseous process.  Impression: No pulmonary embolism identified.    Few small areas of ground-glass in the left lower lobe may be mild infection or inflammation.    Electronically signed by: Freddie Pandya  Date:    02/07/2024  Time:    17:55  X-Ray Chest AP Portable  Narrative: EXAMINATION:  XR CHEST AP PORTABLE    CLINICAL HISTORY:  Sepsis;    TECHNIQUE:  Single view of the chest    COMPARISON:  No prior imaging available for comparison.    FINDINGS:  No focal opacification, pleural effusion, or pneumothorax.    The cardiomediastinal silhouette is within normal limits.    No acute osseous abnormality.  Impression: No acute cardiopulmonary process.    Electronically signed by: Shay Gregory  Date:    02/07/2024  Time:    14:36        Assessment & Plan:   Headache  - ESR 27 and . Trend ESR and CRP  - reviewed  imaging with ENT and no concern for mastoiditis but there is fluid in her right eat.  - will give a does of Fioricet and revevaluate headache. Improved so will not CT at this time. Can  follow up with PCP if migraines are more frequent and not controlled with OTC medications for abortive and preventative therapy.    Otitis Media  PNA  Influenza B  Influenza B positive, order Tamiflu 75 b.i.d. (day 4/5)  continue Zosyn, deescalate for culture  Otitis media covered with Zosyn, deescalate to amoxicillin if cultures are negative  Respiratory panel and cultures  negative  azithromycin for atypical pneumonia coverage ( day 3).  CT temporal bone to rule out mastoiditis.  Reviewed imaging with ENT and does not think it is otitis media. Will await further recommendations  Blood culture 1/2 bottles positive BC ID showing strep pneumoniae. Sensitivities to result in the morning on 2/11/2024 Repeat cultures pending.          Transaminitis  Patient denies alcohol drinking or chronic medication use  Most likely due to stress in the setting of sepsis  Hepatitis panel negative  Follow up with daily labs     Normocytic anemia  H/H 11.1/33.3  Ordered iron panel-consistent with chronic disease  IV iron ordered. Will need PO iron every other day  B12 and folate WNL    KIRBY ( resolved)  Admission BUN/creatinine 24/1.4, no baseline  Most likely prerenal azotemia in the setting of dehydration        CODE STATUS: Full  Access: PIV  Antibiotics: azithro (day 4) and Zosyn (day 4)  Diet:  Regular  DVT Prophylaxis:  Lovenox  GI Prophylaxis:  None  Fluids:  cc/h      Disposition: Loreta Keith is a 53 y.o. female who is admitted for sepsis secondary to influenza B and ear infection which failed outpatient antibiotic therapy.  Initial blood culture positive for strep.  Repeat cultures pending.  Follow up with ENT outpatient.      Mica Bedolla MD  Internal Medicine Resident PGY-I  02/11/2024

## 2024-02-12 VITALS
HEART RATE: 48 BPM | HEIGHT: 67 IN | DIASTOLIC BLOOD PRESSURE: 78 MMHG | WEIGHT: 199.75 LBS | BODY MASS INDEX: 31.35 KG/M2 | OXYGEN SATURATION: 99 % | SYSTOLIC BLOOD PRESSURE: 129 MMHG | TEMPERATURE: 98 F | RESPIRATION RATE: 18 BRPM

## 2024-02-12 LAB
ALBUMIN SERPL-MCNC: 2.5 G/DL (ref 3.5–5)
ALBUMIN/GLOB SERPL: 0.8 RATIO (ref 1.1–2)
ALP SERPL-CCNC: 139 UNIT/L (ref 40–150)
ALT SERPL-CCNC: 90 UNIT/L (ref 0–55)
AST SERPL-CCNC: 34 UNIT/L (ref 5–34)
BACTERIA BLD CULT: NORMAL
BASOPHILS # BLD AUTO: 0.04 X10(3)/MCL
BASOPHILS NFR BLD AUTO: 0.5 %
BILIRUB SERPL-MCNC: 0.7 MG/DL
BUN SERPL-MCNC: 7 MG/DL (ref 9.8–20.1)
CALCIUM SERPL-MCNC: 8 MG/DL (ref 8.4–10.2)
CHLORIDE SERPL-SCNC: 110 MMOL/L (ref 98–107)
CO2 SERPL-SCNC: 24 MMOL/L (ref 22–29)
CREAT SERPL-MCNC: 0.91 MG/DL (ref 0.55–1.02)
EOSINOPHIL # BLD AUTO: 0.05 X10(3)/MCL (ref 0–0.9)
EOSINOPHIL NFR BLD AUTO: 0.6 %
ERYTHROCYTE [DISTWIDTH] IN BLOOD BY AUTOMATED COUNT: 13.4 % (ref 11.5–17)
GFR SERPLBLD CREATININE-BSD FMLA CKD-EPI: >60 MLS/MIN/1.73/M2
GLOBULIN SER-MCNC: 3.2 GM/DL (ref 2.4–3.5)
GLUCOSE SERPL-MCNC: 99 MG/DL (ref 74–100)
HCT VFR BLD AUTO: 30.7 % (ref 37–47)
HGB BLD-MCNC: 10.2 G/DL (ref 12–16)
IMM GRANULOCYTES # BLD AUTO: 0.51 X10(3)/MCL (ref 0–0.04)
IMM GRANULOCYTES NFR BLD AUTO: 6.3 %
LYMPHOCYTES # BLD AUTO: 2.37 X10(3)/MCL (ref 0.6–4.6)
LYMPHOCYTES NFR BLD AUTO: 29.2 %
MCH RBC QN AUTO: 30.9 PG (ref 27–31)
MCHC RBC AUTO-ENTMCNC: 33.2 G/DL (ref 33–36)
MCV RBC AUTO: 93 FL (ref 80–94)
MONOCYTES # BLD AUTO: 0.7 X10(3)/MCL (ref 0.1–1.3)
MONOCYTES NFR BLD AUTO: 8.6 %
NEUTROPHILS # BLD AUTO: 4.44 X10(3)/MCL (ref 2.1–9.2)
NEUTROPHILS NFR BLD AUTO: 54.8 %
NRBC BLD AUTO-RTO: 0 %
PLATELET # BLD AUTO: 224 X10(3)/MCL (ref 130–400)
PMV BLD AUTO: 11.2 FL (ref 7.4–10.4)
POTASSIUM SERPL-SCNC: 3.6 MMOL/L (ref 3.5–5.1)
PROT SERPL-MCNC: 5.7 GM/DL (ref 6.4–8.3)
RBC # BLD AUTO: 3.3 X10(6)/MCL (ref 4.2–5.4)
SODIUM SERPL-SCNC: 140 MMOL/L (ref 136–145)
VIT B2 SERPL-MCNC: 4 MCG/L (ref 1–19)
WBC # SPEC AUTO: 8.11 X10(3)/MCL (ref 4.5–11.5)

## 2024-02-12 PROCEDURE — 63700000 PHARM REV CODE 250 ALT 637 W/O HCPCS

## 2024-02-12 PROCEDURE — 94761 N-INVAS EAR/PLS OXIMETRY MLT: CPT

## 2024-02-12 PROCEDURE — 63600175 PHARM REV CODE 636 W HCPCS: Performed by: INTERNAL MEDICINE

## 2024-02-12 PROCEDURE — 63600175 PHARM REV CODE 636 W HCPCS

## 2024-02-12 PROCEDURE — 80053 COMPREHEN METABOLIC PANEL: CPT

## 2024-02-12 PROCEDURE — 85025 COMPLETE CBC W/AUTO DIFF WBC: CPT

## 2024-02-12 PROCEDURE — 25000003 PHARM REV CODE 250: Performed by: INTERNAL MEDICINE

## 2024-02-12 RX ADMIN — SALINE NASAL SPRAY 2 SPRAY: 1.5 SOLUTION NASAL at 06:02

## 2024-02-12 RX ADMIN — SODIUM CHLORIDE, POTASSIUM CHLORIDE, SODIUM LACTATE AND CALCIUM CHLORIDE: 600; 310; 30; 20 INJECTION, SOLUTION INTRAVENOUS at 05:02

## 2024-02-12 RX ADMIN — PIPERACILLIN AND TAZOBACTAM 4.5 G: 4; .5 INJECTION, POWDER, LYOPHILIZED, FOR SOLUTION INTRAVENOUS; PARENTERAL at 12:02

## 2024-02-12 NOTE — DISCHARGE SUMMARY
[facility] Internal Medicine Discharge Summary    Admitting Physician: Silvia Lazo MD  Attending Physician: Silvia Lazo MD  Date of Admit: 2/7/2024  Date of Discharge: 2/12/2024    Condition: Stable  Outcome: Patient tolerated treatment/procedure well without complication and is now ready for discharge.  DISPOSITION: Home or Self Care          Discharge Diagnoses     Patient Active Problem List   Diagnosis    Influenza B    Hypovolemia    Otitis media    Nonintractable headache       Principal Problem:  Influenza B    Consultants and Procedures     Consultants:  IP CONSULT TO HOSPITAL MEDICINE  IP CONSULT TO ENT    Procedures:   * No surgery found *     Brief Admission History      Loreta Keith is a 53 y.o. female who with no significant past medical history who presented to [facility] ED on 2/7/2024  with complaint of Flu symptoms and AMS.  Patient is poor historian, she told ED physician that her complaints started last week, though told me that it started on lucero, with nasal congestion, non-productive cough, fevers, right ear pain, and fatigue, she went to an urgent care 2 days ago and was started on levaquin, prednisone, and antibiotic ear drops, she said she was disoriented and called her friend to take her to the ED, when she first arrived she was lethargic, dehydrated and ill appearing, she started becoming hypotensive with MAP down to 55, she would respond to fluid resuscitation, but would go back down after stopping the fluids.  Patient denies smoking, alcohol, or drugs, denies any medical history or chronic medical treatment.  ED lab:  Leukocytosis 15.71, normocytic anemia H/H 11.1/33.3, D-dimer >= 20, BUN/creatinine 24/1.4 no baseline, acidotic CO2 19, AST//309, hyponatremia 135,, hypocalcemia 6.7 corrected to 8, hypomagnesemia 1.4, CPK 1300, x-ray unremarkable, CT a chest no PE with small areas of ground-glass in the left lower lobe may be mild infection or inflammation. Hospital medicine was  "consulted for influenza with AMS and possible sepsis 2/2 PNA    Hospital Course with Pertinent Findings     Patient started on tamiflu and vanc, zosyn and azithro. Febrile to 103 during first night of admission and remained afebrile since then. Had a strong reaction to vanc with redness on neck and face associated with headache and burning. MRSA pcr negative so vanc discontinued. Patient had initial blood cultures that were positive for 1 out 2 gram positive strep organism. Repeat cultures were obtained and found to have no growth at 48 hrs. Patient completed adeuqate antibiotic duration to cover for cap and finished tamiflu course. Was evaulated by ENT inpatient due to complaints of otitis media and if had perforation was said to be healing over. Patient still with complaints of fullness but continues to have nasal congestion and cough as well. Will follow up with ENT outpatient. Patient with headaches during admission that resolved with fiorcet. Has history of migraines and were most likely amplified in setting of acute illness. Told to follow up with PCP if symptoms worsen. Stable for discharge.     Discharge physical exam:  Vitals  BP: 129/78  Temp: 98.2 °F (36.8 °C)  Temp Source: Oral  Pulse: (!) 48  Resp: 18  SpO2: 99 %  Height: 5' 7" (170.2 cm)  Weight: 90.6 kg (199 lb 11.8 oz)    General:  Well developed, well nourished, no acute respiratory distress  Head: Normocephalic, atraumatic  Eyes: PERRL, EOMI, anicteric sclera  Throat: No posterior pharyngeal erythema or exudate, no tonsillar exudate  Neck: supple, normal ROM, no JVD  CVS:  RRR, S1 and S2 normal, no murmurs, no added heart sounds, rubs, gallops, regular peripheral pulses, and no peripheral edema  Resp:  Lungs clear to auscultation bilaterally, no wheezes, rales, or rhonci  GI:  Abdomen soft, non-tender, non-distended, normoactive bowel sounds  MSK:  Full range of motion, no obvious deformities   Skin:  No rashes, ulcers, erythema  Neuro:  Alert and " oriented x3, No focal neuro deficits, CNII-XII grossly intact  Psych:  Appropriate mood and affect     TIME SPENT ON DISCHARGE: 60 minutes    Discharge Medications        Medication List        START taking these medications      butalbital-acetaminophen-caffeine -40 mg -40 mg per tablet  Commonly known as: FIORICET, ESGIC  Take 1 tablet by mouth every 4 (four) hours as needed for Headaches.     fluticasone propionate 50 mcg/actuation nasal spray  Commonly known as: FLONASE  2 sprays (100 mcg total) by Each Nostril route 2 (two) times a day.     sodium chloride 0.65 % nasal spray  Commonly known as: OCEAN  2 sprays by Nasal route every 4 (four) hours while awake.               Where to Get Your Medications        These medications were sent to [id number] - [place] - [address] [facility]  [address], [place] [address]      Phone: [phone]   butalbital-acetaminophen-caffeine -40 mg -40 mg per tablet  fluticasone propionate 50 mcg/actuation nasal spray  sodium chloride 0.65 % nasal spray         Discharge Information:   Loreta Keith is being discharged Home or Self Care.    Discharge Procedure Orders   Ambulatory referral/consult to ENT   Standing Status: Future   Referral Priority: Routine Referral Type: Consultation   Referral Reason: Specialty Services Required   Requested Specialty: Otolaryngology   Number of Visits Requested: 1        The above information was discussed with the patient in clear terms. Patient was able to repeat the instructions to me in their own words. All questions answered. ED precautions provided.    Mica Bedolla MD  Internal Medicine Resident PGY-I

## 2024-02-12 NOTE — NURSING
Patient refused all oral meds, antibiotics and IV antibiotics because she was told she had completed them and was leaving today.

## 2024-02-14 LAB
BACTERIA BLD CULT: NORMAL
BACTERIA BLD CULT: NORMAL

## 2024-02-15 LAB
OHS QRS DURATION: 76 MS
OHS QTC CALCULATION: 414 MS